# Patient Record
Sex: FEMALE | Race: WHITE | NOT HISPANIC OR LATINO | Employment: FULL TIME | URBAN - METROPOLITAN AREA
[De-identification: names, ages, dates, MRNs, and addresses within clinical notes are randomized per-mention and may not be internally consistent; named-entity substitution may affect disease eponyms.]

---

## 2017-02-10 RX ORDER — ATENOLOL 50 MG/1
50 TABLET ORAL DAILY
Qty: 90 TABLET | Refills: 1 | Status: SHIPPED | OUTPATIENT
Start: 2017-02-10 | End: 2017-06-07 | Stop reason: SDUPTHER

## 2017-02-27 DIAGNOSIS — I10 ESSENTIAL HYPERTENSION: ICD-10-CM

## 2017-02-27 DIAGNOSIS — E74.39 MALABSORPTION OF GLUCOSE: ICD-10-CM

## 2017-02-27 DIAGNOSIS — E78.2 MIXED HYPERLIPIDEMIA: ICD-10-CM

## 2017-02-27 DIAGNOSIS — E55.9 VITAMIN D DEFICIENCY: ICD-10-CM

## 2017-02-27 DIAGNOSIS — I15.9 SECONDARY HYPERTENSION: Primary | ICD-10-CM

## 2017-02-27 LAB
25(OH)D3+25(OH)D2 SERPL-MCNC: 26.7 NG/ML
ALBUMIN SERPL-MCNC: 3.9 G/DL (ref 3.5–5.2)
ALBUMIN/GLOB SERPL: 1.6 G/DL
ALP SERPL-CCNC: 37 U/L (ref 40–129)
ALT SERPL-CCNC: 8 U/L (ref 5–33)
AST SERPL-CCNC: 9 U/L (ref 5–32)
BASOPHILS # BLD AUTO: 0.04 10*3/MM3 (ref 0–0.2)
BASOPHILS NFR BLD AUTO: 0.7 % (ref 0–2)
BILIRUB SERPL-MCNC: 0.3 MG/DL (ref 0.2–1.2)
BUN SERPL-MCNC: 14 MG/DL (ref 6–20)
BUN/CREAT SERPL: 14 (ref 7–25)
CALCIUM SERPL-MCNC: 9.5 MG/DL (ref 8.6–10.5)
CHLORIDE SERPL-SCNC: 100 MMOL/L (ref 98–107)
CHOLEST SERPL-MCNC: 182 MG/DL (ref 0–200)
CO2 SERPL-SCNC: 26.7 MMOL/L (ref 22–29)
CREAT SERPL-MCNC: 1 MG/DL (ref 0.57–1)
EOSINOPHIL # BLD AUTO: 0.16 10*3/MM3 (ref 0.1–0.3)
EOSINOPHIL NFR BLD AUTO: 2.7 % (ref 0–4)
ERYTHROCYTE [DISTWIDTH] IN BLOOD BY AUTOMATED COUNT: 13.2 % (ref 11.5–14.5)
GLOBULIN SER CALC-MCNC: 2.5 GM/DL
GLUCOSE SERPL-MCNC: 111 MG/DL (ref 65–99)
HBA1C MFR BLD: 5.5 % (ref 4.8–5.6)
HCT VFR BLD AUTO: 41.5 % (ref 37–47)
HDLC SERPL-MCNC: 46 MG/DL (ref 40–60)
HGB BLD-MCNC: 13.4 G/DL (ref 12–16)
IMM GRANULOCYTES # BLD: 0.02 10*3/MM3 (ref 0–0.03)
IMM GRANULOCYTES NFR BLD: 0.3 % (ref 0–0.5)
LDLC SERPL CALC-MCNC: 99 MG/DL (ref 0–100)
LYMPHOCYTES # BLD AUTO: 1.34 10*3/MM3 (ref 0.6–4.8)
LYMPHOCYTES NFR BLD AUTO: 22.3 % (ref 20–45)
MCH RBC QN AUTO: 30.4 PG (ref 27–31)
MCHC RBC AUTO-ENTMCNC: 32.3 G/DL (ref 31–37)
MCV RBC AUTO: 94.1 FL (ref 81–99)
MONOCYTES # BLD AUTO: 0.44 10*3/MM3 (ref 0–1)
MONOCYTES NFR BLD AUTO: 7.3 % (ref 3–8)
NEUTROPHILS # BLD AUTO: 4.02 10*3/MM3 (ref 1.5–8.3)
NEUTROPHILS NFR BLD AUTO: 66.7 % (ref 45–70)
NRBC BLD AUTO-RTO: 0 /100 WBC (ref 0–0)
PLATELET # BLD AUTO: 260 10*3/MM3 (ref 140–500)
POTASSIUM SERPL-SCNC: 4.8 MMOL/L (ref 3.5–5.2)
PROT SERPL-MCNC: 6.4 G/DL (ref 6–8.5)
RBC # BLD AUTO: 4.41 10*6/MM3 (ref 4.2–5.4)
SODIUM SERPL-SCNC: 141 MMOL/L (ref 136–145)
TRIGL SERPL-MCNC: 187 MG/DL (ref 0–150)
TSH SERPL DL<=0.005 MIU/L-ACNC: 3.12 MIU/ML (ref 0.27–4.2)
VLDLC SERPL CALC-MCNC: 37.4 MG/DL (ref 7–27)
WBC # BLD AUTO: 6.02 10*3/MM3 (ref 4.8–10.8)

## 2017-03-08 ENCOUNTER — OFFICE VISIT (OUTPATIENT)
Dept: FAMILY MEDICINE CLINIC | Facility: CLINIC | Age: 57
End: 2017-03-08

## 2017-03-08 VITALS
BODY MASS INDEX: 38.57 KG/M2 | WEIGHT: 240 LBS | HEIGHT: 66 IN | TEMPERATURE: 98.4 F | OXYGEN SATURATION: 98 % | DIASTOLIC BLOOD PRESSURE: 80 MMHG | SYSTOLIC BLOOD PRESSURE: 130 MMHG | HEART RATE: 38 BPM

## 2017-03-08 DIAGNOSIS — E66.09 EXOGENOUS OBESITY: ICD-10-CM

## 2017-03-08 DIAGNOSIS — I10 ESSENTIAL HYPERTENSION: Primary | ICD-10-CM

## 2017-03-08 DIAGNOSIS — E55.9 VITAMIN D DEFICIENCY: ICD-10-CM

## 2017-03-08 DIAGNOSIS — E78.2 MIXED HYPERLIPIDEMIA: ICD-10-CM

## 2017-03-08 DIAGNOSIS — Z12.31 ENCOUNTER FOR SCREENING MAMMOGRAM FOR BREAST CANCER: ICD-10-CM

## 2017-03-08 DIAGNOSIS — E74.39 MALABSORPTION OF GLUCOSE: ICD-10-CM

## 2017-03-08 PROCEDURE — 99213 OFFICE O/P EST LOW 20 MIN: CPT | Performed by: FAMILY MEDICINE

## 2017-03-08 NOTE — PROGRESS NOTES
"Subjective   Marlena Waite is a 56 y.o. female with   Chief Complaint   Patient presents with   • Hypertension     lab follow up   .    History of Present Illness     Marlena is a 56 yr old white female that presents today for follow up HTN, HLD, Vitamin D Deficiency, and Glucose Intolerance.  Currently she uses Atenolol to control blood pressure.  She denies being symptomatic with dizziness upon standing.  Patient has suffered from exogenous obesity and continues to struggle with a discipline diet and exercise program.  She states that she has joined [employer] and is beginning to return to use this facility.  Along with that she is also disciplining her diet more.  Fasting labs have been acquired prior to this visit.      The following portions of the patient's history were reviewed and updated as appropriate: allergies, current medications, past family history, past medical history, past social history, past surgical history and problem list.    Review of Systems   Constitutional:        Exogenous obesity   Cardiovascular:        HTN  HLD   Endocrine:        Vitamin D Def  Glucose intolerance       Objective     Vitals:    03/08/17 1335   BP: 130/80   Pulse: (!) 38   Temp: 98.4 °F (36.9 °C)   SpO2: 98%     BP Readings from Last 3 Encounters:   03/08/17 130/80   08/02/16 116/78   08/04/15 128/62      Wt Readings from Last 3 Encounters:   03/08/17 240 lb (109 kg)   08/02/16 236 lb (107 kg)   08/04/15 231 lb (105 kg)      Orders Only on 02/27/2017   Component Date Value Ref Range Status   • WBC 02/27/2017 6.02  4.80 - 10.80 10*3/mm3 Final   • RBC 02/27/2017 4.41  4.20 - 5.40 10*6/mm3 Final   • Hemoglobin 02/27/2017 13.4  12.0 - 16.0 g/dL Final   • Hematocrit 02/27/2017 41.5  37.0 - 47.0 % Final   • MCV 02/27/2017 94.1  81.0 - 99.0 fL Final   • MCH 02/27/2017 30.4  27.0 - 31.0 pg Final   • MCHC 02/27/2017 32.3  31.0 - 37.0 g/dL Final   • RDW 02/27/2017 13.2  11.5 - 14.5 % Final   • Platelets 02/27/2017 260  140 " - 500 10*3/mm3 Final   • Neutrophil Rel % 02/27/2017 66.7  45.0 - 70.0 % Final   • Lymphocyte Rel % 02/27/2017 22.3  20.0 - 45.0 % Final   • Monocyte Rel % 02/27/2017 7.3  3.0 - 8.0 % Final   • Eosinophil Rel % 02/27/2017 2.7  0.0 - 4.0 % Final   • Basophil Rel % 02/27/2017 0.7  0.0 - 2.0 % Final   • Neutrophils Absolute 02/27/2017 4.02  1.50 - 8.30 10*3/mm3 Final   • Lymphocytes Absolute 02/27/2017 1.34  0.60 - 4.80 10*3/mm3 Final   • Monocytes Absolute 02/27/2017 0.44  0.00 - 1.00 10*3/mm3 Final   • Eosinophils Absolute 02/27/2017 0.16  0.10 - 0.30 10*3/mm3 Final   • Basophils Absolute 02/27/2017 0.04  0.00 - 0.20 10*3/mm3 Final   • Immature Granulocyte Rel % 02/27/2017 0.3  0.0 - 0.5 % Final   • Immature Grans Absolute 02/27/2017 0.02  0.00 - 0.03 10*3/mm3 Final   • nRBC 02/27/2017 0.0  0.0 - 0.0 /100 WBC Final   • Glucose 02/27/2017 111* 65 - 99 mg/dL Final   • BUN 02/27/2017 14  6 - 20 mg/dL Final   • Creatinine 02/27/2017 1.00  0.57 - 1.00 mg/dL Final   • eGFR Non African Am 02/27/2017 57* >60 mL/min/1.73 Final   • eGFR African Am 02/27/2017 69  >60 mL/min/1.73 Final   • BUN/Creatinine Ratio 02/27/2017 14.0  7.0 - 25.0 Final   • Sodium 02/27/2017 141  136 - 145 mmol/L Final   • Potassium 02/27/2017 4.8  3.5 - 5.2 mmol/L Final   • Chloride 02/27/2017 100  98 - 107 mmol/L Final   • Total CO2 02/27/2017 26.7  22.0 - 29.0 mmol/L Final   • Calcium 02/27/2017 9.5  8.6 - 10.5 mg/dL Final   • Total Protein 02/27/2017 6.4  6.0 - 8.5 g/dL Final   • Albumin 02/27/2017 3.90  3.50 - 5.20 g/dL Final   • Globulin 02/27/2017 2.5  gm/dL Final   • A/G Ratio 02/27/2017 1.6  g/dL Final   • Total Bilirubin 02/27/2017 0.3  0.2 - 1.2 mg/dL Final   • Alkaline Phosphatase 02/27/2017 37* 40 - 129 U/L Final   • AST (SGOT) 02/27/2017 9  5 - 32 U/L Final   • ALT (SGPT) 02/27/2017 8  5 - 33 U/L Final   • Hemoglobin A1C 02/27/2017 5.50  4.80 - 5.60 % Final   • Total Cholesterol 02/27/2017 182  0 - 200 mg/dL Final   • Triglycerides  02/27/2017 187* 0 - 150 mg/dL Final   • HDL Cholesterol 02/27/2017 46  40 - 60 mg/dL Final   • VLDL Cholesterol 02/27/2017 37.4* 7 - 27 mg/dL Final   • LDL Cholesterol  02/27/2017 99  0 - 100 mg/dL Final   • TSH 02/27/2017 3.120  0.270 - 4.200 mIU/mL Final   • 25 Hydroxy, Vitamin D 02/27/2017 26.7  ng/mL Final    Comment: Reference Range for Total Vitamin D 25(OH)  Deficiency    <20.0 ng/mL  Insufficiency 21-29 ng/mL  Sufficiency   30-74 ng/mL       The above results have been reviewed and explained to Marlena with her understanding.  A copy has been provided to her.     Physical Exam   Constitutional: She is oriented to person, place, and time. She appears well-developed and well-nourished.   HENT:   Head: Normocephalic and atraumatic.   Neck: Trachea normal and phonation normal. Neck supple. Normal carotid pulses present. Carotid bruit is not present. No thyroid mass and no thyromegaly present.   Cardiovascular: Normal rate, regular rhythm and normal heart sounds.  Exam reveals no gallop and no friction rub.    No murmur heard.  Pulmonary/Chest: Effort normal and breath sounds normal. No respiratory distress. She has no decreased breath sounds. She has no wheezes. She has no rhonchi. She has no rales.   Lymphadenopathy:     She has no cervical adenopathy.   Neurological: She is alert and oriented to person, place, and time.   Skin: Skin is warm and dry. No rash noted.   Psychiatric: She has a normal mood and affect. Her speech is normal and behavior is normal. Judgment and thought content normal. Cognition and memory are normal.   Nursing note and vitals reviewed.      Assessment/Plan   Marlena was seen today for hypertension.    Diagnoses and all orders for this visit:    Essential hypertension  -     CBC & Differential; Future  -     Comprehensive Metabolic Panel; Future  -     Lipid Panel; Future  -     TSH; Future    Mixed hyperlipidemia  -     CBC & Differential; Future  -     Comprehensive Metabolic Panel;  Future  -     Lipid Panel; Future    Vitamin D deficiency  -     CBC & Differential; Future  -     Comprehensive Metabolic Panel; Future  -     Vitamin D 25 Hydroxy; Future    Malabsorption of glucose  -     CBC & Differential; Future  -     Comprehensive Metabolic Panel; Future  -     Hemoglobin A1c; Future    Encounter for screening mammogram for breast cancer  -     Mammo Screening Bilateral With CAD; Future    Exogenous obesity      Return in about 6 months (around 9/8/2017).      Scribed for Jordan Tavarez MD by Daryl Haley. 03/08/2017    IJordan MD personally performed the services described in this documentation, as scribed by Daryl Haley in my presence, and it is both accurate and complete

## 2017-03-09 PROBLEM — E66.09 EXOGENOUS OBESITY: Status: ACTIVE | Noted: 2017-03-09

## 2017-03-09 NOTE — PATIENT INSTRUCTIONS
Weight loss again has been encouraged.  This will require a discipline diet and exercise program.  Current medications will be continued without alteration.  Anticipate next follow-up in 6 months preceded by fasting laboratory studies.

## 2017-03-21 ENCOUNTER — HOSPITAL ENCOUNTER (OUTPATIENT)
Dept: MAMMOGRAPHY | Facility: HOSPITAL | Age: 57
Discharge: HOME | End: 2017-03-21
Admitting: FAMILY MEDICINE

## 2017-03-21 DIAGNOSIS — Z12.31 ENCOUNTER FOR SCREENING MAMMOGRAM FOR BREAST CANCER: ICD-10-CM

## 2017-03-21 DIAGNOSIS — R92.8 ABNORMAL MAMMOGRAM OF LEFT BREAST: Primary | ICD-10-CM

## 2017-03-21 PROCEDURE — G0202 SCR MAMMO BI INCL CAD: HCPCS

## 2017-03-28 ENCOUNTER — HOSPITAL ENCOUNTER (OUTPATIENT)
Dept: ULTRASOUND IMAGING | Facility: HOSPITAL | Age: 57
Discharge: HOME | End: 2017-03-28
Admitting: FAMILY MEDICINE

## 2017-03-28 DIAGNOSIS — R92.8 ABNORMAL MAMMOGRAM OF LEFT BREAST: ICD-10-CM

## 2017-03-28 PROCEDURE — 76641 ULTRASOUND BREAST COMPLETE: CPT

## 2017-06-07 RX ORDER — ATENOLOL 50 MG/1
50 TABLET ORAL DAILY
Qty: 90 TABLET | Refills: 1 | Status: SHIPPED | OUTPATIENT
Start: 2017-06-07 | End: 2017-09-18 | Stop reason: SDUPTHER

## 2017-08-24 DIAGNOSIS — E74.39 MALABSORPTION OF GLUCOSE: ICD-10-CM

## 2017-08-24 DIAGNOSIS — I10 ESSENTIAL HYPERTENSION: ICD-10-CM

## 2017-08-24 DIAGNOSIS — E78.2 MIXED HYPERLIPIDEMIA: ICD-10-CM

## 2017-08-24 DIAGNOSIS — E55.9 VITAMIN D DEFICIENCY: ICD-10-CM

## 2017-08-28 LAB
ALBUMIN SERPL-MCNC: 4 G/DL (ref 3.5–5.2)
ALBUMIN/GLOB SERPL: 1.6 G/DL
ALP SERPL-CCNC: 34 U/L (ref 40–129)
ALT SERPL-CCNC: 25 U/L (ref 5–33)
AST SERPL-CCNC: 16 U/L (ref 5–32)
BASOPHILS # BLD AUTO: 0.04 10*3/MM3 (ref 0–0.2)
BASOPHILS NFR BLD AUTO: 0.7 % (ref 0–2)
BILIRUB SERPL-MCNC: 0.4 MG/DL (ref 0.2–1.2)
BUN SERPL-MCNC: 20 MG/DL (ref 6–20)
BUN/CREAT SERPL: 20 (ref 7–25)
CALCIUM SERPL-MCNC: 9.4 MG/DL (ref 8.6–10.5)
CHLORIDE SERPL-SCNC: 100 MMOL/L (ref 98–107)
CHOLEST SERPL-MCNC: 178 MG/DL (ref 0–200)
CO2 SERPL-SCNC: 26.5 MMOL/L (ref 22–29)
CREAT SERPL-MCNC: 1 MG/DL (ref 0.57–1)
EOSINOPHIL # BLD AUTO: 0.15 10*3/MM3 (ref 0.1–0.3)
EOSINOPHIL NFR BLD AUTO: 2.7 % (ref 0–4)
ERYTHROCYTE [DISTWIDTH] IN BLOOD BY AUTOMATED COUNT: 13.3 % (ref 11.5–14.5)
GLOBULIN SER CALC-MCNC: 2.5 GM/DL
GLUCOSE SERPL-MCNC: 154 MG/DL (ref 65–99)
HBA1C MFR BLD: 5.5 % (ref 4.8–5.6)
HCT VFR BLD AUTO: 43.6 % (ref 37–47)
HDLC SERPL-MCNC: 38 MG/DL (ref 40–60)
HGB BLD-MCNC: 14 G/DL (ref 12–16)
IMM GRANULOCYTES # BLD: 0.03 10*3/MM3 (ref 0–0.03)
IMM GRANULOCYTES NFR BLD: 0.5 % (ref 0–0.5)
LDLC SERPL CALC-MCNC: 88 MG/DL (ref 0–100)
LYMPHOCYTES # BLD AUTO: 1.15 10*3/MM3 (ref 0.6–4.8)
LYMPHOCYTES NFR BLD AUTO: 20.8 % (ref 20–45)
MCH RBC QN AUTO: 30.6 PG (ref 27–31)
MCHC RBC AUTO-ENTMCNC: 32.1 G/DL (ref 31–37)
MCV RBC AUTO: 95.4 FL (ref 81–99)
MONOCYTES # BLD AUTO: 0.46 10*3/MM3 (ref 0–1)
MONOCYTES NFR BLD AUTO: 8.3 % (ref 3–8)
NEUTROPHILS # BLD AUTO: 3.69 10*3/MM3 (ref 1.5–8.3)
NEUTROPHILS NFR BLD AUTO: 67 % (ref 45–70)
NRBC BLD AUTO-RTO: 0 /100 WBC (ref 0–0)
PLATELET # BLD AUTO: 250 10*3/MM3 (ref 140–500)
POTASSIUM SERPL-SCNC: 4.6 MMOL/L (ref 3.5–5.2)
PROT SERPL-MCNC: 6.5 G/DL (ref 6–8.5)
RBC # BLD AUTO: 4.57 10*6/MM3 (ref 4.2–5.4)
SODIUM SERPL-SCNC: 138 MMOL/L (ref 136–145)
TRIGL SERPL-MCNC: 259 MG/DL (ref 0–150)
TSH SERPL DL<=0.005 MIU/L-ACNC: 3.4 MIU/ML (ref 0.27–4.2)
VLDLC SERPL CALC-MCNC: 51.8 MG/DL (ref 7–27)
WBC # BLD AUTO: 5.52 10*3/MM3 (ref 4.8–10.8)

## 2017-08-29 LAB — 25(OH)D3+25(OH)D2 SERPL-MCNC: 33.3 NG/ML

## 2017-09-05 ENCOUNTER — OFFICE VISIT (OUTPATIENT)
Dept: FAMILY MEDICINE CLINIC | Facility: CLINIC | Age: 57
End: 2017-09-05

## 2017-09-05 VITALS
DIASTOLIC BLOOD PRESSURE: 80 MMHG | SYSTOLIC BLOOD PRESSURE: 120 MMHG | HEIGHT: 66 IN | OXYGEN SATURATION: 89 % | TEMPERATURE: 98.4 F | WEIGHT: 245 LBS | HEART RATE: 89 BPM | BODY MASS INDEX: 39.37 KG/M2

## 2017-09-05 DIAGNOSIS — I10 ESSENTIAL HYPERTENSION: ICD-10-CM

## 2017-09-05 DIAGNOSIS — E66.09 EXOGENOUS OBESITY: ICD-10-CM

## 2017-09-05 DIAGNOSIS — E74.39 MALABSORPTION OF GLUCOSE: ICD-10-CM

## 2017-09-05 DIAGNOSIS — E55.9 VITAMIN D DEFICIENCY: ICD-10-CM

## 2017-09-05 DIAGNOSIS — E78.2 MIXED HYPERLIPIDEMIA: Primary | ICD-10-CM

## 2017-09-05 PROCEDURE — 99213 OFFICE O/P EST LOW 20 MIN: CPT | Performed by: FAMILY MEDICINE

## 2017-09-05 NOTE — PROGRESS NOTES
Subjective   Marlena Waite is a 57 y.o. female with   Chief Complaint   Patient presents with   • Hyperlipidemia     follow up labs   • Hypertension   .    History of Present Illness     Patient presents in follow up of HLD, HTN, Vitamin D Deficiency and Glucose Intolerance.  Patient continues to use Atenolol daily and tolerates this well.  Patient has gained weight since last visit.  Patient states that the last 9 months have been very stressful for her with issue with a family pet and helping a family member move.  Most recent mammogram was in March 2017.  There is no record of colonoscopy in [facility] or [facility].Patient has struggled with exogenous obesity and admits that she has gained weight most recently.  She states that she is in the process of moving her fiancé and/or home and this should be completed by the end of September.  Once this process has finished she states that they have already discussed displaying their diet, losing weight, and attending [employer] for workouts.      The following portions of the patient's history were reviewed and updated as appropriate: allergies, current medications, past family history, past medical history, past social history, past surgical history and problem list.    Review of Systems   Constitutional:        Morbidly obese   Cardiovascular:        HTN  HLD   Endocrine:        Glucose Intolerance  Vit D Def   All other systems reviewed and are negative.      Objective     Vitals:    09/05/17 0744   BP: 120/80   Pulse: 89   Temp: 98.4 °F (36.9 °C)   SpO2: (!) 89%     BP Readings from Last 3 Encounters:   09/05/17 120/80   03/08/17 130/80   08/02/16 116/78      Wt Readings from Last 3 Encounters:   09/05/17 245 lb (111 kg)   03/08/17 240 lb (109 kg)   08/02/16 236 lb (107 kg)      Orders Only on 08/24/2017   Component Date Value Ref Range Status   • WBC 08/28/2017 5.52  4.80 - 10.80 10*3/mm3 Final   • RBC 08/28/2017 4.57  4.20 - 5.40 10*6/mm3 Final   • Hemoglobin  08/28/2017 14.0  12.0 - 16.0 g/dL Final   • Hematocrit 08/28/2017 43.6  37.0 - 47.0 % Final   • MCV 08/28/2017 95.4  81.0 - 99.0 fL Final   • MCH 08/28/2017 30.6  27.0 - 31.0 pg Final   • MCHC 08/28/2017 32.1  31.0 - 37.0 g/dL Final   • RDW 08/28/2017 13.3  11.5 - 14.5 % Final   • Platelets 08/28/2017 250  140 - 500 10*3/mm3 Final   • Neutrophil Rel % 08/28/2017 67.0  45.0 - 70.0 % Final   • Lymphocyte Rel % 08/28/2017 20.8  20.0 - 45.0 % Final   • Monocyte Rel % 08/28/2017 8.3* 3.0 - 8.0 % Final   • Eosinophil Rel % 08/28/2017 2.7  0.0 - 4.0 % Final   • Basophil Rel % 08/28/2017 0.7  0.0 - 2.0 % Final   • Neutrophils Absolute 08/28/2017 3.69  1.50 - 8.30 10*3/mm3 Final   • Lymphocytes Absolute 08/28/2017 1.15  0.60 - 4.80 10*3/mm3 Final   • Monocytes Absolute 08/28/2017 0.46  0.00 - 1.00 10*3/mm3 Final   • Eosinophils Absolute 08/28/2017 0.15  0.10 - 0.30 10*3/mm3 Final   • Basophils Absolute 08/28/2017 0.04  0.00 - 0.20 10*3/mm3 Final   • Immature Granulocyte Rel % 08/28/2017 0.5  0.0 - 0.5 % Final   • Immature Grans Absolute 08/28/2017 0.03  0.00 - 0.03 10*3/mm3 Final   • nRBC 08/28/2017 0.0  0.0 - 0.0 /100 WBC Final   • Glucose 08/28/2017 154* 65 - 99 mg/dL Final   • BUN 08/28/2017 20  6 - 20 mg/dL Final   • Creatinine 08/28/2017 1.00  0.57 - 1.00 mg/dL Final   • eGFR Non African Am 08/28/2017 57* >60 mL/min/1.73 Final   • eGFR African Am 08/28/2017 69  >60 mL/min/1.73 Final   • BUN/Creatinine Ratio 08/28/2017 20.0  7.0 - 25.0 Final   • Sodium 08/28/2017 138  136 - 145 mmol/L Final   • Potassium 08/28/2017 4.6  3.5 - 5.2 mmol/L Final   • Chloride 08/28/2017 100  98 - 107 mmol/L Final   • Total CO2 08/28/2017 26.5  22.0 - 29.0 mmol/L Final   • Calcium 08/28/2017 9.4  8.6 - 10.5 mg/dL Final   • Total Protein 08/28/2017 6.5  6.0 - 8.5 g/dL Final   • Albumin 08/28/2017 4.00  3.50 - 5.20 g/dL Final   • Globulin 08/28/2017 2.5  gm/dL Final   • A/G Ratio 08/28/2017 1.6  g/dL Final   • Total Bilirubin 08/28/2017 0.4   0.2 - 1.2 mg/dL Final   • Alkaline Phosphatase 08/28/2017 34* 40 - 129 U/L Final   • AST (SGOT) 08/28/2017 16  5 - 32 U/L Final   • ALT (SGPT) 08/28/2017 25  5 - 33 U/L Final   • Hemoglobin A1C 08/28/2017 5.50  4.80 - 5.60 % Final   • Total Cholesterol 08/28/2017 178  0 - 200 mg/dL Final   • Triglycerides 08/28/2017 259* 0 - 150 mg/dL Final   • HDL Cholesterol 08/28/2017 38* 40 - 60 mg/dL Final   • VLDL Cholesterol 08/28/2017 51.8* 7 - 27 mg/dL Final   • LDL Cholesterol  08/28/2017 88  0 - 100 mg/dL Final   • TSH 08/28/2017 3.400  0.270 - 4.200 mIU/mL Final   • 25 Hydroxy, Vitamin D 08/28/2017 33.3  ng/mL Final    Comment: Reference Range for Total Vitamin D 25(OH)  Deficiency    <20.0 ng/mL  Insufficiency 21-29 ng/mL  Sufficiency   30-74 ng/mL       The above results have been reviewed and explained to the patient.  The patient has been provided a copy    Physical Exam   Constitutional: She is oriented to person, place, and time. She appears well-developed and well-nourished.   HENT:   Head: Normocephalic and atraumatic.   Neck: Trachea normal and phonation normal. Neck supple. Normal carotid pulses present. Carotid bruit is not present. No thyroid mass and no thyromegaly present.   Cardiovascular: Normal rate, regular rhythm and normal heart sounds.  Exam reveals no gallop and no friction rub.    No murmur heard.  Pulmonary/Chest: Effort normal and breath sounds normal. No respiratory distress. She has no decreased breath sounds. She has no wheezes. She has no rhonchi. She has no rales.   Lymphadenopathy:     She has no cervical adenopathy.   Neurological: She is alert and oriented to person, place, and time.   Skin: Skin is warm and dry. No rash noted.   Psychiatric: She has a normal mood and affect. Her speech is normal and behavior is normal. Judgment and thought content normal. Cognition and memory are normal.   Nursing note and vitals reviewed.      Assessment/Plan   Marlena was seen today for hyperlipidemia  and hypertension.    Diagnoses and all orders for this visit:    Mixed hyperlipidemia  -     CBC & Differential; Future  -     Comprehensive Metabolic Panel; Future  -     Lipid Panel; Future    Essential hypertension  -     CBC & Differential; Future  -     Comprehensive Metabolic Panel; Future  -     Lipid Panel; Future  -     TSH; Future    Vitamin D deficiency  -     CBC & Differential; Future  -     Comprehensive Metabolic Panel; Future  -     Vitamin D 25 Hydroxy; Future    Malabsorption of glucose  -     CBC & Differential; Future  -     Comprehensive Metabolic Panel; Future  -     Hemoglobin A1c; Future    Exogenous obesity      Patient Instructions   Avoid sugar in diet.  Restrict calories to help lower weight.  Start exercising more to improve HDL cholesterol.  Patient is due for routine colonoscopy.        Return in about 6 months (around 3/5/2018).    Scribed for Jordan Tavarez MD by Daryl Haley. 09/05/2017    IJordan MD personally performed the services described in this documentation, as scribed by Daryl Haley in my presence, and it is both accurate and complete

## 2017-09-05 NOTE — PATIENT INSTRUCTIONS
Avoid sugar in diet.  Restrict calories to help lower weight.  Start exercising more to improve HDL cholesterol.  Patient is due for routine colonoscopy.

## 2017-09-18 RX ORDER — ATENOLOL 50 MG/1
50 TABLET ORAL DAILY
Qty: 90 TABLET | Refills: 0 | Status: SHIPPED | OUTPATIENT
Start: 2017-09-18 | End: 2017-09-21 | Stop reason: SDUPTHER

## 2017-09-21 RX ORDER — ATENOLOL 50 MG/1
50 TABLET ORAL DAILY
Qty: 90 TABLET | Refills: 1 | Status: SHIPPED | OUTPATIENT
Start: 2017-09-21 | End: 2018-03-05 | Stop reason: SDUPTHER

## 2018-03-05 ENCOUNTER — RESULTS ENCOUNTER (OUTPATIENT)
Dept: FAMILY MEDICINE CLINIC | Facility: CLINIC | Age: 58
End: 2018-03-05

## 2018-03-05 DIAGNOSIS — E55.9 VITAMIN D DEFICIENCY: ICD-10-CM

## 2018-03-05 DIAGNOSIS — E74.39 MALABSORPTION OF GLUCOSE: ICD-10-CM

## 2018-03-05 DIAGNOSIS — E78.2 MIXED HYPERLIPIDEMIA: ICD-10-CM

## 2018-03-05 DIAGNOSIS — I10 ESSENTIAL HYPERTENSION: ICD-10-CM

## 2018-03-06 RX ORDER — ATENOLOL 50 MG/1
TABLET ORAL
Qty: 90 TABLET | Refills: 0 | Status: SHIPPED | OUTPATIENT
Start: 2018-03-06 | End: 2018-05-25 | Stop reason: SDUPTHER

## 2018-03-08 LAB
25(OH)D3+25(OH)D2 SERPL-MCNC: 31.2 NG/ML
ALBUMIN SERPL-MCNC: 4.2 G/DL (ref 3.5–5.2)
ALBUMIN/GLOB SERPL: 1.8 G/DL
ALP SERPL-CCNC: 35 U/L (ref 40–129)
ALT SERPL-CCNC: 37 U/L (ref 5–33)
AST SERPL-CCNC: 20 U/L (ref 5–32)
BASOPHILS # BLD AUTO: 0.04 10*3/MM3 (ref 0–0.2)
BASOPHILS NFR BLD AUTO: 0.9 % (ref 0–2)
BILIRUB SERPL-MCNC: 0.6 MG/DL (ref 0.2–1.2)
BUN SERPL-MCNC: 15 MG/DL (ref 6–20)
BUN/CREAT SERPL: 16.9 (ref 7–25)
CALCIUM SERPL-MCNC: 9.7 MG/DL (ref 8.6–10.5)
CHLORIDE SERPL-SCNC: 103 MMOL/L (ref 98–107)
CHOLEST SERPL-MCNC: 198 MG/DL (ref 0–200)
CO2 SERPL-SCNC: 27.3 MMOL/L (ref 22–29)
CREAT SERPL-MCNC: 0.89 MG/DL (ref 0.57–1)
EOSINOPHIL # BLD AUTO: 0.12 10*3/MM3 (ref 0.1–0.3)
EOSINOPHIL NFR BLD AUTO: 2.6 % (ref 0–4)
ERYTHROCYTE [DISTWIDTH] IN BLOOD BY AUTOMATED COUNT: 13.2 % (ref 11.5–14.5)
GFR SERPLBLD CREATININE-BSD FMLA CKD-EPI: 65 ML/MIN/1.73
GFR SERPLBLD CREATININE-BSD FMLA CKD-EPI: 79 ML/MIN/1.73
GLOBULIN SER CALC-MCNC: 2.3 GM/DL
GLUCOSE SERPL-MCNC: 152 MG/DL (ref 65–99)
HBA1C MFR BLD: 6 % (ref 4.8–5.6)
HCT VFR BLD AUTO: 42.5 % (ref 37–47)
HDLC SERPL-MCNC: 41 MG/DL (ref 40–60)
HGB BLD-MCNC: 14 G/DL (ref 12–16)
IMM GRANULOCYTES # BLD: 0.02 10*3/MM3 (ref 0–0.03)
IMM GRANULOCYTES NFR BLD: 0.4 % (ref 0–0.5)
LDLC SERPL CALC-MCNC: 95 MG/DL (ref 0–100)
LYMPHOCYTES # BLD AUTO: 1.08 10*3/MM3 (ref 0.6–4.8)
LYMPHOCYTES NFR BLD AUTO: 23 % (ref 20–45)
MCH RBC QN AUTO: 30.7 PG (ref 27–31)
MCHC RBC AUTO-ENTMCNC: 32.9 G/DL (ref 31–37)
MCV RBC AUTO: 93.2 FL (ref 81–99)
MONOCYTES # BLD AUTO: 0.34 10*3/MM3 (ref 0–1)
MONOCYTES NFR BLD AUTO: 7.2 % (ref 3–8)
NEUTROPHILS # BLD AUTO: 3.09 10*3/MM3 (ref 1.5–8.3)
NEUTROPHILS NFR BLD AUTO: 65.9 % (ref 45–70)
NRBC BLD AUTO-RTO: 0 /100 WBC (ref 0–0)
PLATELET # BLD AUTO: 212 10*3/MM3 (ref 140–500)
POTASSIUM SERPL-SCNC: 4.5 MMOL/L (ref 3.5–5.2)
PROT SERPL-MCNC: 6.5 G/DL (ref 6–8.5)
RBC # BLD AUTO: 4.56 10*6/MM3 (ref 4.2–5.4)
SODIUM SERPL-SCNC: 142 MMOL/L (ref 136–145)
TRIGL SERPL-MCNC: 309 MG/DL (ref 0–150)
TSH SERPL DL<=0.005 MIU/L-ACNC: 3.48 MIU/ML (ref 0.27–4.2)
VLDLC SERPL CALC-MCNC: 61.8 MG/DL (ref 7–27)
WBC # BLD AUTO: 4.69 10*3/MM3 (ref 4.8–10.8)

## 2018-03-14 ENCOUNTER — OFFICE VISIT (OUTPATIENT)
Dept: FAMILY MEDICINE CLINIC | Facility: CLINIC | Age: 58
End: 2018-03-14

## 2018-03-14 VITALS
SYSTOLIC BLOOD PRESSURE: 122 MMHG | WEIGHT: 251 LBS | BODY MASS INDEX: 40.34 KG/M2 | HEIGHT: 66 IN | OXYGEN SATURATION: 98 % | TEMPERATURE: 97.6 F | HEART RATE: 82 BPM | DIASTOLIC BLOOD PRESSURE: 80 MMHG

## 2018-03-14 DIAGNOSIS — E78.2 MIXED HYPERLIPIDEMIA: Primary | ICD-10-CM

## 2018-03-14 DIAGNOSIS — I10 ESSENTIAL HYPERTENSION: ICD-10-CM

## 2018-03-14 DIAGNOSIS — E55.9 VITAMIN D DEFICIENCY: ICD-10-CM

## 2018-03-14 DIAGNOSIS — N91.2 AMENORRHEA: ICD-10-CM

## 2018-03-14 DIAGNOSIS — E66.01 MORBID EXOGENOUS OBESITY (HCC): ICD-10-CM

## 2018-03-14 DIAGNOSIS — R73.02 GLUCOSE INTOLERANCE (IMPAIRED GLUCOSE TOLERANCE): ICD-10-CM

## 2018-03-14 PROCEDURE — 99213 OFFICE O/P EST LOW 20 MIN: CPT | Performed by: FAMILY MEDICINE

## 2018-03-14 NOTE — PATIENT INSTRUCTIONS
Results for orders placed or performed in visit on 03/05/18   Comprehensive Metabolic Panel   Result Value Ref Range    Glucose 152 (H) 65 - 99 mg/dL    BUN 15 6 - 20 mg/dL    Creatinine 0.89 0.57 - 1.00 mg/dL    eGFR Non African Am 65 >60 mL/min/1.73    eGFR African Am 79 >60 mL/min/1.73    BUN/Creatinine Ratio 16.9 7.0 - 25.0    Sodium 142 136 - 145 mmol/L    Potassium 4.5 3.5 - 5.2 mmol/L    Chloride 103 98 - 107 mmol/L    Total CO2 27.3 22.0 - 29.0 mmol/L    Calcium 9.7 8.6 - 10.5 mg/dL    Total Protein 6.5 6.0 - 8.5 g/dL    Albumin 4.20 3.50 - 5.20 g/dL    Globulin 2.3 gm/dL    A/G Ratio 1.8 g/dL    Total Bilirubin 0.6 0.2 - 1.2 mg/dL    Alkaline Phosphatase 35 (L) 40 - 129 U/L    AST (SGOT) 20 5 - 32 U/L    ALT (SGPT) 37 (H) 5 - 33 U/L   Hemoglobin A1c   Result Value Ref Range    Hemoglobin A1C 6.00 (H) 4.80 - 5.60 %   Lipid Panel   Result Value Ref Range    Total Cholesterol 198 0 - 200 mg/dL    Triglycerides 309 (H) 0 - 150 mg/dL    HDL Cholesterol 41 40 - 60 mg/dL    VLDL Cholesterol 61.8 (H) 7 - 27 mg/dL    LDL Cholesterol  95 0 - 100 mg/dL   TSH   Result Value Ref Range    TSH 3.480 0.270 - 4.200 mIU/mL   Vitamin D 25 Hydroxy   Result Value Ref Range    25 Hydroxy, Vitamin D 31.2 ng/ml   CBC & Differential   Result Value Ref Range    WBC 4.69 (L) 4.80 - 10.80 10*3/mm3    RBC 4.56 4.20 - 5.40 10*6/mm3    Hemoglobin 14.0 12.0 - 16.0 g/dL    Hematocrit 42.5 37.0 - 47.0 %    MCV 93.2 81.0 - 99.0 fL    MCH 30.7 27.0 - 31.0 pg    MCHC 32.9 31.0 - 37.0 g/dL    RDW 13.2 11.5 - 14.5 %    Platelets 212 140 - 500 10*3/mm3    Neutrophil Rel % 65.9 45.0 - 70.0 %    Lymphocyte Rel % 23.0 20.0 - 45.0 %    Monocyte Rel % 7.2 3.0 - 8.0 %    Eosinophil Rel % 2.6 0.0 - 4.0 %    Basophil Rel % 0.9 0.0 - 2.0 %    Neutrophils Absolute 3.09 1.50 - 8.30 10*3/mm3    Lymphocytes Absolute 1.08 0.60 - 4.80 10*3/mm3    Monocytes Absolute 0.34 0.00 - 1.00 10*3/mm3    Eosinophils Absolute 0.12 0.10 - 0.30 10*3/mm3    Basophils Absolute  0.04 0.00 - 0.20 10*3/mm3    Immature Granulocyte Rel % 0.4 0.0 - 0.5 %    Immature Grans Absolute 0.02 0.00 - 0.03 10*3/mm3    nRBC 0.0 0.0 - 0.0 /100 WBC     Lower weight with diet and exercise  Continue current medications

## 2018-03-14 NOTE — PROGRESS NOTES
Subjective   Marlena Waite is a 57 y.o. female with   Chief Complaint   Patient presents with   • Hyperlipidemia     follow up labs   • Hypertension   .    History of Present Illness     Patient presents for follow up of stable HLD and stable HTN, stable Vitamin D Deficiency, and unstable Glucose Intolerance.  Current antihypertensive is well tolerated.  Patient has gained weight since last visit.  Patient states that she may have started to go through menopause as January 2018 was her last menstrual cycle.      The following portions of the patient's history were reviewed and updated as appropriate: allergies, current medications, past family history, past medical history, past social history, past surgical history and problem list.    Review of Systems   Constitutional:        Morbidly obese   Cardiovascular: Negative for chest pain, palpitations and leg swelling.        HTN  HLD   Endocrine: Negative for cold intolerance and heat intolerance.        Glucose Intolerance  Vit D Def   All other systems reviewed and are negative.      Objective     Vitals:    03/14/18 0732   BP: 122/80   Pulse: 82   Temp: 97.6 °F (36.4 °C)   SpO2: 98%     BP Readings from Last 3 Encounters:   03/14/18 122/80   09/05/17 120/80   03/08/17 130/80      Wt Readings from Last 3 Encounters:   03/14/18 114 kg (251 lb)   09/05/17 111 kg (245 lb)   03/08/17 109 kg (240 lb)          Recent Results (from the past 168 hour(s))   CBC & Differential    Collection Time: 03/08/18  8:04 AM   Result Value Ref Range    WBC 4.69 (L) 4.80 - 10.80 10*3/mm3    RBC 4.56 4.20 - 5.40 10*6/mm3    Hemoglobin 14.0 12.0 - 16.0 g/dL    Hematocrit 42.5 37.0 - 47.0 %    MCV 93.2 81.0 - 99.0 fL    MCH 30.7 27.0 - 31.0 pg    MCHC 32.9 31.0 - 37.0 g/dL    RDW 13.2 11.5 - 14.5 %    Platelets 212 140 - 500 10*3/mm3    Neutrophil Rel % 65.9 45.0 - 70.0 %    Lymphocyte Rel % 23.0 20.0 - 45.0 %    Monocyte Rel % 7.2 3.0 - 8.0 %    Eosinophil Rel % 2.6 0.0 - 4.0 %     Basophil Rel % 0.9 0.0 - 2.0 %    Neutrophils Absolute 3.09 1.50 - 8.30 10*3/mm3    Lymphocytes Absolute 1.08 0.60 - 4.80 10*3/mm3    Monocytes Absolute 0.34 0.00 - 1.00 10*3/mm3    Eosinophils Absolute 0.12 0.10 - 0.30 10*3/mm3    Basophils Absolute 0.04 0.00 - 0.20 10*3/mm3    Immature Granulocyte Rel % 0.4 0.0 - 0.5 %    Immature Grans Absolute 0.02 0.00 - 0.03 10*3/mm3    nRBC 0.0 0.0 - 0.0 /100 WBC   Comprehensive Metabolic Panel    Collection Time: 03/08/18  8:04 AM   Result Value Ref Range    Glucose 152 (H) 65 - 99 mg/dL    BUN 15 6 - 20 mg/dL    Creatinine 0.89 0.57 - 1.00 mg/dL    eGFR Non African Am 65 >60 mL/min/1.73    eGFR African Am 79 >60 mL/min/1.73    BUN/Creatinine Ratio 16.9 7.0 - 25.0    Sodium 142 136 - 145 mmol/L    Potassium 4.5 3.5 - 5.2 mmol/L    Chloride 103 98 - 107 mmol/L    Total CO2 27.3 22.0 - 29.0 mmol/L    Calcium 9.7 8.6 - 10.5 mg/dL    Total Protein 6.5 6.0 - 8.5 g/dL    Albumin 4.20 3.50 - 5.20 g/dL    Globulin 2.3 gm/dL    A/G Ratio 1.8 g/dL    Total Bilirubin 0.6 0.2 - 1.2 mg/dL    Alkaline Phosphatase 35 (L) 40 - 129 U/L    AST (SGOT) 20 5 - 32 U/L    ALT (SGPT) 37 (H) 5 - 33 U/L   Hemoglobin A1c    Collection Time: 03/08/18  8:04 AM   Result Value Ref Range    Hemoglobin A1C 6.00 (H) 4.80 - 5.60 %   Lipid Panel    Collection Time: 03/08/18  8:04 AM   Result Value Ref Range    Total Cholesterol 198 0 - 200 mg/dL    Triglycerides 309 (H) 0 - 150 mg/dL    HDL Cholesterol 41 40 - 60 mg/dL    VLDL Cholesterol 61.8 (H) 7 - 27 mg/dL    LDL Cholesterol  95 0 - 100 mg/dL   TSH    Collection Time: 03/08/18  8:04 AM   Result Value Ref Range    TSH 3.480 0.270 - 4.200 mIU/mL   Vitamin D 25 Hydroxy    Collection Time: 03/08/18  8:04 AM   Result Value Ref Range    25 Hydroxy, Vitamin D 31.2 ng/ml       Physical Exam   Constitutional: She is oriented to person, place, and time. She appears well-developed and well-nourished.   HENT:   Head: Normocephalic and atraumatic.   Neck: Trachea  normal and phonation normal. Neck supple. Normal carotid pulses present. Carotid bruit is not present. No thyroid mass and no thyromegaly present.   Cardiovascular: Normal rate, regular rhythm and normal heart sounds.  Exam reveals no gallop and no friction rub.    No murmur heard.  Pulmonary/Chest: Effort normal and breath sounds normal. No respiratory distress. She has no decreased breath sounds. She has no wheezes. She has no rhonchi. She has no rales.   Lymphadenopathy:     She has no cervical adenopathy.   Neurological: She is alert and oriented to person, place, and time.   Skin: Skin is warm and dry. No rash noted.   Psychiatric: She has a normal mood and affect. Her speech is normal and behavior is normal. Judgment and thought content normal. Cognition and memory are normal.   Nursing note and vitals reviewed.    Patient Instructions     Results for orders placed or performed in visit on 03/05/18   Comprehensive Metabolic Panel   Result Value Ref Range    Glucose 152 (H) 65 - 99 mg/dL    BUN 15 6 - 20 mg/dL    Creatinine 0.89 0.57 - 1.00 mg/dL    eGFR Non African Am 65 >60 mL/min/1.73    eGFR African Am 79 >60 mL/min/1.73    BUN/Creatinine Ratio 16.9 7.0 - 25.0    Sodium 142 136 - 145 mmol/L    Potassium 4.5 3.5 - 5.2 mmol/L    Chloride 103 98 - 107 mmol/L    Total CO2 27.3 22.0 - 29.0 mmol/L    Calcium 9.7 8.6 - 10.5 mg/dL    Total Protein 6.5 6.0 - 8.5 g/dL    Albumin 4.20 3.50 - 5.20 g/dL    Globulin 2.3 gm/dL    A/G Ratio 1.8 g/dL    Total Bilirubin 0.6 0.2 - 1.2 mg/dL    Alkaline Phosphatase 35 (L) 40 - 129 U/L    AST (SGOT) 20 5 - 32 U/L    ALT (SGPT) 37 (H) 5 - 33 U/L   Hemoglobin A1c   Result Value Ref Range    Hemoglobin A1C 6.00 (H) 4.80 - 5.60 %   Lipid Panel   Result Value Ref Range    Total Cholesterol 198 0 - 200 mg/dL    Triglycerides 309 (H) 0 - 150 mg/dL    HDL Cholesterol 41 40 - 60 mg/dL    VLDL Cholesterol 61.8 (H) 7 - 27 mg/dL    LDL Cholesterol  95 0 - 100 mg/dL   TSH   Result Value Ref  Range    TSH 3.480 0.270 - 4.200 mIU/mL   Vitamin D 25 Hydroxy   Result Value Ref Range    25 Hydroxy, Vitamin D 31.2 ng/ml   CBC & Differential   Result Value Ref Range    WBC 4.69 (L) 4.80 - 10.80 10*3/mm3    RBC 4.56 4.20 - 5.40 10*6/mm3    Hemoglobin 14.0 12.0 - 16.0 g/dL    Hematocrit 42.5 37.0 - 47.0 %    MCV 93.2 81.0 - 99.0 fL    MCH 30.7 27.0 - 31.0 pg    MCHC 32.9 31.0 - 37.0 g/dL    RDW 13.2 11.5 - 14.5 %    Platelets 212 140 - 500 10*3/mm3    Neutrophil Rel % 65.9 45.0 - 70.0 %    Lymphocyte Rel % 23.0 20.0 - 45.0 %    Monocyte Rel % 7.2 3.0 - 8.0 %    Eosinophil Rel % 2.6 0.0 - 4.0 %    Basophil Rel % 0.9 0.0 - 2.0 %    Neutrophils Absolute 3.09 1.50 - 8.30 10*3/mm3    Lymphocytes Absolute 1.08 0.60 - 4.80 10*3/mm3    Monocytes Absolute 0.34 0.00 - 1.00 10*3/mm3    Eosinophils Absolute 0.12 0.10 - 0.30 10*3/mm3    Basophils Absolute 0.04 0.00 - 0.20 10*3/mm3    Immature Granulocyte Rel % 0.4 0.0 - 0.5 %    Immature Grans Absolute 0.02 0.00 - 0.03 10*3/mm3    nRBC 0.0 0.0 - 0.0 /100 WBC     Lower weight with diet and exercise  Continue current medications        Assessment/Plan   Marlena was seen today for hyperlipidemia and hypertension.    Diagnoses and all orders for this visit:    Mixed hyperlipidemia  -     CBC & Differential; Future  -     Comprehensive Metabolic Panel; Future  -     Lipid Panel; Future    Essential hypertension  -     CBC & Differential; Future  -     Comprehensive Metabolic Panel; Future  -     Lipid Panel; Future  -     TSH; Future    Vitamin D deficiency  -     CBC & Differential; Future  -     Vitamin D 25 Hydroxy; Future    Glucose intolerance (impaired glucose tolerance)  -     CBC & Differential; Future  -     Hemoglobin A1c; Future  -     Comprehensive Metabolic Panel; Future    Amenorrhea  -     Follicle Stimulating Hormone; Future  -     Estradiol; Future    Morbid exogenous obesity        Return in about 6 months (around 9/14/2018).    Scribed for Jordan Tavarez MD by  Daryl Haley. 03/14/2018    I, Jordan Tavarez MD personally performed the services described in this documentation, as scribed by Daryl Haley in my presence, and it is both accurate and complete

## 2018-03-15 PROBLEM — R73.02 GLUCOSE INTOLERANCE (IMPAIRED GLUCOSE TOLERANCE): Status: ACTIVE | Noted: 2018-03-15

## 2018-03-15 PROBLEM — E66.09 EXOGENOUS OBESITY: Status: RESOLVED | Noted: 2017-03-09 | Resolved: 2018-03-15

## 2018-03-15 PROBLEM — E66.01 MORBID EXOGENOUS OBESITY: Status: ACTIVE | Noted: 2018-03-15

## 2018-05-29 RX ORDER — ATENOLOL 50 MG/1
TABLET ORAL
Qty: 90 TABLET | Refills: 0 | Status: SHIPPED | OUTPATIENT
Start: 2018-05-29 | End: 2018-08-17 | Stop reason: SDUPTHER

## 2018-08-20 RX ORDER — ATENOLOL 50 MG/1
TABLET ORAL
Qty: 90 TABLET | Refills: 1 | Status: SHIPPED | OUTPATIENT
Start: 2018-08-20 | End: 2019-03-27

## 2018-08-22 DIAGNOSIS — E78.2 MIXED HYPERLIPIDEMIA: ICD-10-CM

## 2018-08-22 DIAGNOSIS — E55.9 VITAMIN D DEFICIENCY: ICD-10-CM

## 2018-08-22 DIAGNOSIS — R73.02 GLUCOSE INTOLERANCE (IMPAIRED GLUCOSE TOLERANCE): ICD-10-CM

## 2018-08-22 DIAGNOSIS — I10 ESSENTIAL HYPERTENSION: ICD-10-CM

## 2018-08-22 DIAGNOSIS — N91.2 AMENORRHEA: ICD-10-CM

## 2018-09-13 LAB
25(OH)D3+25(OH)D2 SERPL-MCNC: 42.6 NG/ML
ALBUMIN SERPL-MCNC: 4.2 G/DL (ref 3.5–5.2)
ALBUMIN/GLOB SERPL: 1.6 G/DL
ALP SERPL-CCNC: 43 U/L (ref 40–129)
ALT SERPL-CCNC: 43 U/L (ref 5–33)
AST SERPL-CCNC: 24 U/L (ref 5–32)
BASOPHILS # BLD AUTO: 0.04 10*3/MM3 (ref 0–0.2)
BASOPHILS NFR BLD AUTO: 0.9 % (ref 0–2)
BILIRUB SERPL-MCNC: 0.3 MG/DL (ref 0.2–1.2)
BUN SERPL-MCNC: 13 MG/DL (ref 8–23)
BUN/CREAT SERPL: 16.9 (ref 7–25)
CALCIUM SERPL-MCNC: 9.6 MG/DL (ref 8.8–10.5)
CHLORIDE SERPL-SCNC: 104 MMOL/L (ref 98–107)
CHOLEST SERPL-MCNC: 204 MG/DL (ref 0–200)
CO2 SERPL-SCNC: 27 MMOL/L (ref 22–29)
CREAT SERPL-MCNC: 0.77 MG/DL (ref 0.57–1)
EOSINOPHIL # BLD AUTO: 0.13 10*3/MM3 (ref 0.1–0.3)
EOSINOPHIL NFR BLD AUTO: 2.9 % (ref 0–4)
ERYTHROCYTE [DISTWIDTH] IN BLOOD BY AUTOMATED COUNT: 13.3 % (ref 11.5–14.5)
ESTRADIOL SERPL-MCNC: 13.6 PG/ML
FSH SERPL-ACNC: 37 MIU/ML
GLOBULIN SER CALC-MCNC: 2.6 GM/DL
GLUCOSE SERPL-MCNC: 162 MG/DL (ref 65–99)
HBA1C MFR BLD: 5.9 % (ref 4.8–5.6)
HCT VFR BLD AUTO: 42.4 % (ref 37–47)
HDLC SERPL-MCNC: 45 MG/DL (ref 40–60)
HGB BLD-MCNC: 13.4 G/DL (ref 12–16)
IMM GRANULOCYTES # BLD: 0.01 10*3/MM3 (ref 0–0.03)
IMM GRANULOCYTES NFR BLD: 0.2 % (ref 0–0.5)
LDLC SERPL CALC-MCNC: 112 MG/DL (ref 0–100)
LYMPHOCYTES # BLD AUTO: 1.01 10*3/MM3 (ref 0.6–4.8)
LYMPHOCYTES NFR BLD AUTO: 22.5 % (ref 20–45)
MCH RBC QN AUTO: 30.2 PG (ref 27–31)
MCHC RBC AUTO-ENTMCNC: 31.6 G/DL (ref 31–37)
MCV RBC AUTO: 95.5 FL (ref 81–99)
MONOCYTES # BLD AUTO: 0.44 10*3/MM3 (ref 0–1)
MONOCYTES NFR BLD AUTO: 9.8 % (ref 3–8)
NEUTROPHILS # BLD AUTO: 2.85 10*3/MM3 (ref 1.5–8.3)
NEUTROPHILS NFR BLD AUTO: 63.7 % (ref 45–70)
NRBC BLD AUTO-RTO: 0 /100 WBC (ref 0–0)
PLATELET # BLD AUTO: 244 10*3/MM3 (ref 140–500)
POTASSIUM SERPL-SCNC: 4.8 MMOL/L (ref 3.5–5.2)
PROT SERPL-MCNC: 6.8 G/DL (ref 6–8.5)
RBC # BLD AUTO: 4.44 10*6/MM3 (ref 4.2–5.4)
SODIUM SERPL-SCNC: 141 MMOL/L (ref 136–145)
TRIGL SERPL-MCNC: 237 MG/DL (ref 0–150)
TSH SERPL DL<=0.005 MIU/L-ACNC: 4.57 MIU/ML (ref 0.27–4.2)
VLDLC SERPL CALC-MCNC: 47.4 MG/DL (ref 7–27)
WBC # BLD AUTO: 4.48 10*3/MM3 (ref 4.8–10.8)

## 2018-09-24 ENCOUNTER — OFFICE VISIT (OUTPATIENT)
Dept: FAMILY MEDICINE CLINIC | Facility: CLINIC | Age: 58
End: 2018-09-24

## 2018-09-24 VITALS
RESPIRATION RATE: 16 BRPM | BODY MASS INDEX: 40.6 KG/M2 | WEIGHT: 252.6 LBS | OXYGEN SATURATION: 96 % | DIASTOLIC BLOOD PRESSURE: 82 MMHG | TEMPERATURE: 98 F | SYSTOLIC BLOOD PRESSURE: 130 MMHG | HEIGHT: 66 IN | HEART RATE: 84 BPM

## 2018-09-24 DIAGNOSIS — R73.02 GLUCOSE INTOLERANCE (IMPAIRED GLUCOSE TOLERANCE): ICD-10-CM

## 2018-09-24 DIAGNOSIS — E55.9 VITAMIN D DEFICIENCY: ICD-10-CM

## 2018-09-24 DIAGNOSIS — E66.01 MORBID EXOGENOUS OBESITY (HCC): ICD-10-CM

## 2018-09-24 DIAGNOSIS — E03.9 ACQUIRED HYPOTHYROIDISM: ICD-10-CM

## 2018-09-24 DIAGNOSIS — I10 ESSENTIAL HYPERTENSION: Primary | ICD-10-CM

## 2018-09-24 DIAGNOSIS — E78.2 MIXED HYPERLIPIDEMIA: ICD-10-CM

## 2018-09-24 PROCEDURE — 99213 OFFICE O/P EST LOW 20 MIN: CPT | Performed by: FAMILY MEDICINE

## 2018-09-24 NOTE — PROGRESS NOTES
Subjective   Marlena Waite is a 58 y.o. female with   Chief Complaint   Patient presents with   • Follow-up     on labs   .    History of Present Illness     Pt is a 59 yo white female who presents today for follow up on recent lab work done for unstable HLD, unstable glucose intolerance, unstable TSH and unstable Vit D Def.  At the time of today's appointment pt's is with stable HTN also.  Pt has a history of morbid exogenous obesity, she has gained 12 pounds since March of 2017.  Pt is currently prescribed Atenolol which she tolerates well.  Pt does still have a uterus and an occasional menses usually about every 2-3 months.  Recent lab work indicates she is lizz menopausal.  Pt states she is doing well and has no further complaints at this time.    The following portions of the patient's history were reviewed and updated as appropriate: allergies, current medications, past family history, past medical history, past social history, past surgical history and problem list.    Review of Systems   Constitutional:        Morbid Exogenous Obesity   Cardiovascular: Negative for chest pain, palpitations and leg swelling.        HTN  HLD   Endocrine: Negative for cold intolerance and heat intolerance.        Glucose Intolerance  Vit D Def   All other systems reviewed and are negative.      Objective     Vitals:    09/24/18 0732   BP: 130/82   Pulse: 84   Resp: 16   Temp: 98 °F (36.7 °C)   SpO2: 96%     BP Readings from Last 3 Encounters:   09/24/18 130/82   03/14/18 122/80   09/05/17 120/80      Wt Readings from Last 3 Encounters:   09/24/18 115 kg (252 lb 9.6 oz)   03/14/18 114 kg (251 lb)   09/05/17 111 kg (245 lb)      No visits with results within 2 Week(s) from this visit.   Latest known visit with results is:   Orders Only on 08/22/2018   Component Date Value Ref Range Status   • WBC 09/12/2018 4.48* 4.80 - 10.80 10*3/mm3 Final   • RBC 09/12/2018 4.44  4.20 - 5.40 10*6/mm3 Final   • Hemoglobin 09/12/2018 13.4  12.0  - 16.0 g/dL Final   • Hematocrit 09/12/2018 42.4  37.0 - 47.0 % Final   • MCV 09/12/2018 95.5  81.0 - 99.0 fL Final   • MCH 09/12/2018 30.2  27.0 - 31.0 pg Final   • MCHC 09/12/2018 31.6  31.0 - 37.0 g/dL Final   • RDW 09/12/2018 13.3  11.5 - 14.5 % Final   • Platelets 09/12/2018 244  140 - 500 10*3/mm3 Final   • Neutrophil Rel % 09/12/2018 63.7  45.0 - 70.0 % Final   • Lymphocyte Rel % 09/12/2018 22.5  20.0 - 45.0 % Final   • Monocyte Rel % 09/12/2018 9.8* 3.0 - 8.0 % Final   • Eosinophil Rel % 09/12/2018 2.9  0.0 - 4.0 % Final   • Basophil Rel % 09/12/2018 0.9  0.0 - 2.0 % Final   • Neutrophils Absolute 09/12/2018 2.85  1.50 - 8.30 10*3/mm3 Final   • Lymphocytes Absolute 09/12/2018 1.01  0.60 - 4.80 10*3/mm3 Final   • Monocytes Absolute 09/12/2018 0.44  0.00 - 1.00 10*3/mm3 Final   • Eosinophils Absolute 09/12/2018 0.13  0.10 - 0.30 10*3/mm3 Final   • Basophils Absolute 09/12/2018 0.04  0.00 - 0.20 10*3/mm3 Final   • Immature Granulocyte Rel % 09/12/2018 0.2  0.0 - 0.5 % Final   • Immature Grans Absolute 09/12/2018 0.01  0.00 - 0.03 10*3/mm3 Final   • nRBC 09/12/2018 0.0  0.0 - 0.0 /100 WBC Final   • Hemoglobin A1C 09/12/2018 5.90* 4.80 - 5.60 % Final   • Glucose 09/12/2018 162* 65 - 99 mg/dL Final   • BUN 09/12/2018 13  8 - 23 mg/dL Final   • Creatinine 09/12/2018 0.77  0.57 - 1.00 mg/dL Final   • eGFR Non  Am 09/12/2018 76  >60 mL/min/1.73 Final   • eGFR African Am 09/12/2018 93  >60 mL/min/1.73 Final   • BUN/Creatinine Ratio 09/12/2018 16.9  7.0 - 25.0 Final   • Sodium 09/12/2018 141  136 - 145 mmol/L Final   • Potassium 09/12/2018 4.8  3.5 - 5.2 mmol/L Final   • Chloride 09/12/2018 104  98 - 107 mmol/L Final   • Total CO2 09/12/2018 27.0  22.0 - 29.0 mmol/L Final   • Calcium 09/12/2018 9.6  8.8 - 10.5 mg/dL Final   • Total Protein 09/12/2018 6.8  6.0 - 8.5 g/dL Final   • Albumin 09/12/2018 4.20  3.50 - 5.20 g/dL Final   • Globulin 09/12/2018 2.6  gm/dL Final   • A/G Ratio 09/12/2018 1.6  g/dL Final   •  Total Bilirubin 09/12/2018 0.3  0.2 - 1.2 mg/dL Final   • Alkaline Phosphatase 09/12/2018 43  40 - 129 U/L Final   • AST (SGOT) 09/12/2018 24  5 - 32 U/L Final   • ALT (SGPT) 09/12/2018 43* 5 - 33 U/L Final   • Total Cholesterol 09/12/2018 204* 0 - 200 mg/dL Final   • Triglycerides 09/12/2018 237* 0 - 150 mg/dL Final   • HDL Cholesterol 09/12/2018 45  40 - 60 mg/dL Final   • VLDL Cholesterol 09/12/2018 47.4* 7 - 27 mg/dL Final   • LDL Cholesterol  09/12/2018 112* 0 - 100 mg/dL Final   • TSH 09/12/2018 4.570* 0.270 - 4.200 mIU/mL Final   • 25 Hydroxy, Vitamin D 09/12/2018 42.6  ng/ml Final    Comment: Reference Range for Total Vitamin D 25(OH)  Deficiency    <20.0 ng/mL  Insufficiency 21-29 ng/mL  Sufficiency   30-74 ng/mL     • FSH 09/12/2018 37.0  mIU/mL Final    Comment:                     Adult Female:                        Follicular phase      3.5 -  12.5                        Ovulation phase       4.7 -  21.5                        Luteal phase          1.7 -   7.7                        Postmenopausal       25.8 - 134.8     • Estradiol 09/12/2018 13.6  pg/mL Final    Comment:                     Adult Female:                        Follicular phase   12.5 -   166.0                        Ovulation phase    85.8 -   498.0                        Luteal phase       43.8 -   211.0                        Postmenopausal     <6.0 -    54.7                      Pregnancy                        1st trimester     215.0 - >4300.0                      Girls (1-10 years)    6.0 -    27.0  [employer] ECLIA methodology           Physical Exam   Constitutional: She is oriented to person, place, and time. She appears well-developed and well-nourished.   HENT:   Head: Normocephalic and atraumatic.   Neck: Trachea normal and phonation normal. Neck supple. Normal carotid pulses present. Carotid bruit is not present. No thyroid mass and no thyromegaly present.   Cardiovascular: Normal rate, regular rhythm and normal heart sounds.   Exam reveals no gallop and no friction rub.    No murmur heard.  Pulmonary/Chest: Effort normal and breath sounds normal. No respiratory distress. She has no decreased breath sounds. She has no wheezes. She has no rhonchi. She has no rales.   Lymphadenopathy:     She has no cervical adenopathy.   Neurological: She is alert and oriented to person, place, and time.   Skin: Skin is warm and dry. No rash noted.   Psychiatric: She has a normal mood and affect. Her speech is normal and behavior is normal. Judgment and thought content normal. Cognition and memory are normal.   Nursing note and vitals reviewed.      Assessment/Plan   Marlena was seen today for follow-up.    Diagnoses and all orders for this visit:    Essential hypertension  -     CBC & Differential; Future  -     Comprehensive Metabolic Panel; Future  -     TSH; Future    Mixed hyperlipidemia  -     Lipid Panel; Future    Glucose intolerance (impaired glucose tolerance)  -     CBC & Differential; Future  -     Comprehensive Metabolic Panel; Future  -     Hemoglobin A1c; Future    Vitamin D deficiency  -     Vitamin D 25 Hydroxy; Future    Morbid exogenous obesity (CMS/HCC)  -     TSH; Future    Acquired hypothyroidism      Patient Instructions     Results for orders placed or performed in visit on 08/22/18   Hemoglobin A1c   Result Value Ref Range    Hemoglobin A1C 5.90 (H) 4.80 - 5.60 %   Comprehensive Metabolic Panel   Result Value Ref Range    Glucose 162 (H) 65 - 99 mg/dL    BUN 13 8 - 23 mg/dL    Creatinine 0.77 0.57 - 1.00 mg/dL    eGFR Non African Am 76 >60 mL/min/1.73    eGFR African Am 93 >60 mL/min/1.73    BUN/Creatinine Ratio 16.9 7.0 - 25.0    Sodium 141 136 - 145 mmol/L    Potassium 4.8 3.5 - 5.2 mmol/L    Chloride 104 98 - 107 mmol/L    Total CO2 27.0 22.0 - 29.0 mmol/L    Calcium 9.6 8.8 - 10.5 mg/dL    Total Protein 6.8 6.0 - 8.5 g/dL    Albumin 4.20 3.50 - 5.20 g/dL    Globulin 2.6 gm/dL    A/G Ratio 1.6 g/dL    Total Bilirubin 0.3 0.2 -  1.2 mg/dL    Alkaline Phosphatase 43 40 - 129 U/L    AST (SGOT) 24 5 - 32 U/L    ALT (SGPT) 43 (H) 5 - 33 U/L   Lipid Panel   Result Value Ref Range    Total Cholesterol 204 (H) 0 - 200 mg/dL    Triglycerides 237 (H) 0 - 150 mg/dL    HDL Cholesterol 45 40 - 60 mg/dL    VLDL Cholesterol 47.4 (H) 7 - 27 mg/dL    LDL Cholesterol  112 (H) 0 - 100 mg/dL   TSH   Result Value Ref Range    TSH 4.570 (H) 0.270 - 4.200 mIU/mL   Vitamin D 25 Hydroxy   Result Value Ref Range    25 Hydroxy, Vitamin D 42.6 ng/ml   Follicle Stimulating Hormone   Result Value Ref Range    FSH 37.0 mIU/mL   Estradiol   Result Value Ref Range    Estradiol 13.6 pg/mL   CBC & Differential   Result Value Ref Range    WBC 4.48 (L) 4.80 - 10.80 10*3/mm3    RBC 4.44 4.20 - 5.40 10*6/mm3    Hemoglobin 13.4 12.0 - 16.0 g/dL    Hematocrit 42.4 37.0 - 47.0 %    MCV 95.5 81.0 - 99.0 fL    MCH 30.2 27.0 - 31.0 pg    MCHC 31.6 31.0 - 37.0 g/dL    RDW 13.3 11.5 - 14.5 %    Platelets 244 140 - 500 10*3/mm3    Neutrophil Rel % 63.7 45.0 - 70.0 %    Lymphocyte Rel % 22.5 20.0 - 45.0 %    Monocyte Rel % 9.8 (H) 3.0 - 8.0 %    Eosinophil Rel % 2.9 0.0 - 4.0 %    Basophil Rel % 0.9 0.0 - 2.0 %    Neutrophils Absolute 2.85 1.50 - 8.30 10*3/mm3    Lymphocytes Absolute 1.01 0.60 - 4.80 10*3/mm3    Monocytes Absolute 0.44 0.00 - 1.00 10*3/mm3    Eosinophils Absolute 0.13 0.10 - 0.30 10*3/mm3    Basophils Absolute 0.04 0.00 - 0.20 10*3/mm3    Immature Granulocyte Rel % 0.2 0.0 - 0.5 %    Immature Grans Absolute 0.01 0.00 - 0.03 10*3/mm3    nRBC 0.0 0.0 - 0.0 /100 WBC     Consider starting Vitamin D3 4000 IU daily.  Watch for symptoms such as increasing amounts of hair loss, dry skin or bowel changes which could be due to the thyroid.  Continue current medication, work on diet and exercise to promote weight loss.      Return in about 6 months (around 3/24/2019).    Scribed for Jordan Tavarez MD by Keyanna Guan CMA. 09/24/2018    IJordan MD personally performed the  services described in this documentation, as scribed by Keyanna Guan CMA in my presence, and it is both accurate and complete

## 2018-09-24 NOTE — PATIENT INSTRUCTIONS
Results for orders placed or performed in visit on 08/22/18   Hemoglobin A1c   Result Value Ref Range    Hemoglobin A1C 5.90 (H) 4.80 - 5.60 %   Comprehensive Metabolic Panel   Result Value Ref Range    Glucose 162 (H) 65 - 99 mg/dL    BUN 13 8 - 23 mg/dL    Creatinine 0.77 0.57 - 1.00 mg/dL    eGFR Non African Am 76 >60 mL/min/1.73    eGFR African Am 93 >60 mL/min/1.73    BUN/Creatinine Ratio 16.9 7.0 - 25.0    Sodium 141 136 - 145 mmol/L    Potassium 4.8 3.5 - 5.2 mmol/L    Chloride 104 98 - 107 mmol/L    Total CO2 27.0 22.0 - 29.0 mmol/L    Calcium 9.6 8.8 - 10.5 mg/dL    Total Protein 6.8 6.0 - 8.5 g/dL    Albumin 4.20 3.50 - 5.20 g/dL    Globulin 2.6 gm/dL    A/G Ratio 1.6 g/dL    Total Bilirubin 0.3 0.2 - 1.2 mg/dL    Alkaline Phosphatase 43 40 - 129 U/L    AST (SGOT) 24 5 - 32 U/L    ALT (SGPT) 43 (H) 5 - 33 U/L   Lipid Panel   Result Value Ref Range    Total Cholesterol 204 (H) 0 - 200 mg/dL    Triglycerides 237 (H) 0 - 150 mg/dL    HDL Cholesterol 45 40 - 60 mg/dL    VLDL Cholesterol 47.4 (H) 7 - 27 mg/dL    LDL Cholesterol  112 (H) 0 - 100 mg/dL   TSH   Result Value Ref Range    TSH 4.570 (H) 0.270 - 4.200 mIU/mL   Vitamin D 25 Hydroxy   Result Value Ref Range    25 Hydroxy, Vitamin D 42.6 ng/ml   Follicle Stimulating Hormone   Result Value Ref Range    FSH 37.0 mIU/mL   Estradiol   Result Value Ref Range    Estradiol 13.6 pg/mL   CBC & Differential   Result Value Ref Range    WBC 4.48 (L) 4.80 - 10.80 10*3/mm3    RBC 4.44 4.20 - 5.40 10*6/mm3    Hemoglobin 13.4 12.0 - 16.0 g/dL    Hematocrit 42.4 37.0 - 47.0 %    MCV 95.5 81.0 - 99.0 fL    MCH 30.2 27.0 - 31.0 pg    MCHC 31.6 31.0 - 37.0 g/dL    RDW 13.3 11.5 - 14.5 %    Platelets 244 140 - 500 10*3/mm3    Neutrophil Rel % 63.7 45.0 - 70.0 %    Lymphocyte Rel % 22.5 20.0 - 45.0 %    Monocyte Rel % 9.8 (H) 3.0 - 8.0 %    Eosinophil Rel % 2.9 0.0 - 4.0 %    Basophil Rel % 0.9 0.0 - 2.0 %    Neutrophils Absolute 2.85 1.50 - 8.30 10*3/mm3    Lymphocytes  Absolute 1.01 0.60 - 4.80 10*3/mm3    Monocytes Absolute 0.44 0.00 - 1.00 10*3/mm3    Eosinophils Absolute 0.13 0.10 - 0.30 10*3/mm3    Basophils Absolute 0.04 0.00 - 0.20 10*3/mm3    Immature Granulocyte Rel % 0.2 0.0 - 0.5 %    Immature Grans Absolute 0.01 0.00 - 0.03 10*3/mm3    nRBC 0.0 0.0 - 0.0 /100 WBC     Consider starting Vitamin D3 4000 IU daily.  Watch for symptoms such as increasing amounts of hair loss, dry skin or bowel changes which could be due to the thyroid.  Continue current medication, work on diet and exercise to promote weight loss.

## 2019-01-07 RX ORDER — ATENOLOL 50 MG/1
50 TABLET ORAL DAILY
Qty: 90 TABLET | Refills: 0 | Status: SHIPPED | OUTPATIENT
Start: 2019-01-07 | End: 2019-04-01 | Stop reason: SDUPTHER

## 2019-02-13 DIAGNOSIS — R73.02 GLUCOSE INTOLERANCE (IMPAIRED GLUCOSE TOLERANCE): ICD-10-CM

## 2019-02-13 DIAGNOSIS — I10 ESSENTIAL HYPERTENSION: ICD-10-CM

## 2019-02-13 DIAGNOSIS — E55.9 VITAMIN D DEFICIENCY: ICD-10-CM

## 2019-02-13 DIAGNOSIS — E66.01 MORBID EXOGENOUS OBESITY (HCC): ICD-10-CM

## 2019-02-13 DIAGNOSIS — E78.2 MIXED HYPERLIPIDEMIA: ICD-10-CM

## 2019-03-22 LAB
25(OH)D3+25(OH)D2 SERPL-MCNC: 34.2 NG/ML (ref 30–100)
ALBUMIN SERPL-MCNC: 4.1 G/DL (ref 3.5–5.2)
ALBUMIN/GLOB SERPL: 1.8 G/DL
ALP SERPL-CCNC: 38 U/L (ref 39–117)
ALT SERPL-CCNC: 38 U/L (ref 1–33)
AST SERPL-CCNC: 19 U/L (ref 1–32)
BASOPHILS # BLD AUTO: 0.04 10*3/MM3 (ref 0–0.2)
BASOPHILS NFR BLD AUTO: 0.7 % (ref 0–1.5)
BILIRUB SERPL-MCNC: 0.5 MG/DL (ref 0.2–1.2)
BUN SERPL-MCNC: 16 MG/DL (ref 8–23)
BUN/CREAT SERPL: 19.8 (ref 7–25)
CALCIUM SERPL-MCNC: 10.5 MG/DL (ref 8.6–10.5)
CHLORIDE SERPL-SCNC: 102 MMOL/L (ref 98–107)
CHOLEST SERPL-MCNC: 190 MG/DL (ref 0–200)
CO2 SERPL-SCNC: 25.9 MMOL/L (ref 22–29)
CREAT SERPL-MCNC: 0.81 MG/DL (ref 0.57–1)
EOSINOPHIL # BLD AUTO: 0.14 10*3/MM3 (ref 0–0.4)
EOSINOPHIL NFR BLD AUTO: 2.6 % (ref 0.3–6.2)
ERYTHROCYTE [DISTWIDTH] IN BLOOD BY AUTOMATED COUNT: 13.2 % (ref 12.3–15.4)
GLOBULIN SER CALC-MCNC: 2.3 GM/DL
GLUCOSE SERPL-MCNC: 159 MG/DL (ref 65–99)
HBA1C MFR BLD: 6.1 % (ref 4.8–5.6)
HCT VFR BLD AUTO: 42.8 % (ref 34–46.6)
HDLC SERPL-MCNC: 36 MG/DL (ref 40–60)
HGB BLD-MCNC: 13.4 G/DL (ref 12–15.9)
IMM GRANULOCYTES # BLD AUTO: 0.02 10*3/MM3 (ref 0–0.05)
IMM GRANULOCYTES NFR BLD AUTO: 0.4 % (ref 0–0.5)
LDLC SERPL CALC-MCNC: 95 MG/DL (ref 0–100)
LYMPHOCYTES # BLD AUTO: 1.27 10*3/MM3 (ref 0.7–3.1)
LYMPHOCYTES NFR BLD AUTO: 23.4 % (ref 19.6–45.3)
MCH RBC QN AUTO: 29.6 PG (ref 26.6–33)
MCHC RBC AUTO-ENTMCNC: 31.3 G/DL (ref 31.5–35.7)
MCV RBC AUTO: 94.5 FL (ref 79–97)
MONOCYTES # BLD AUTO: 0.44 10*3/MM3 (ref 0.1–0.9)
MONOCYTES NFR BLD AUTO: 8.1 % (ref 5–12)
NEUTROPHILS # BLD AUTO: 3.51 10*3/MM3 (ref 1.4–7)
NEUTROPHILS NFR BLD AUTO: 64.8 % (ref 42.7–76)
NRBC BLD AUTO-RTO: 0 /100 WBC (ref 0–0)
PLATELET # BLD AUTO: 248 10*3/MM3 (ref 140–450)
POTASSIUM SERPL-SCNC: 4.6 MMOL/L (ref 3.5–5.2)
PROT SERPL-MCNC: 6.4 G/DL (ref 6–8.5)
RBC # BLD AUTO: 4.53 10*6/MM3 (ref 3.77–5.28)
SODIUM SERPL-SCNC: 140 MMOL/L (ref 136–145)
TRIGL SERPL-MCNC: 295 MG/DL (ref 0–150)
TSH SERPL DL<=0.005 MIU/L-ACNC: 3.79 MIU/ML (ref 0.27–4.2)
VLDLC SERPL CALC-MCNC: 59 MG/DL (ref 5–40)
WBC # BLD AUTO: 5.42 10*3/MM3 (ref 3.4–10.8)

## 2019-03-27 ENCOUNTER — OFFICE VISIT (OUTPATIENT)
Dept: FAMILY MEDICINE CLINIC | Facility: CLINIC | Age: 59
End: 2019-03-27

## 2019-03-27 VITALS
BODY MASS INDEX: 39.53 KG/M2 | SYSTOLIC BLOOD PRESSURE: 136 MMHG | HEART RATE: 69 BPM | HEIGHT: 66 IN | OXYGEN SATURATION: 97 % | DIASTOLIC BLOOD PRESSURE: 84 MMHG | WEIGHT: 246 LBS

## 2019-03-27 DIAGNOSIS — R73.02 GLUCOSE INTOLERANCE (IMPAIRED GLUCOSE TOLERANCE): ICD-10-CM

## 2019-03-27 DIAGNOSIS — E55.9 VITAMIN D DEFICIENCY: ICD-10-CM

## 2019-03-27 DIAGNOSIS — E78.2 MIXED HYPERLIPIDEMIA: ICD-10-CM

## 2019-03-27 DIAGNOSIS — I10 ESSENTIAL HYPERTENSION: Primary | ICD-10-CM

## 2019-03-27 DIAGNOSIS — E66.01 MORBID EXOGENOUS OBESITY (HCC): ICD-10-CM

## 2019-03-27 DIAGNOSIS — M79.672 BILATERAL FOOT PAIN: ICD-10-CM

## 2019-03-27 DIAGNOSIS — M79.671 BILATERAL FOOT PAIN: ICD-10-CM

## 2019-03-27 PROCEDURE — 99213 OFFICE O/P EST LOW 20 MIN: CPT | Performed by: FAMILY MEDICINE

## 2019-03-27 RX ORDER — CHOLECALCIFEROL (VITAMIN D3) 125 MCG
2 CAPSULE ORAL
COMMUNITY

## 2019-03-27 RX ORDER — FERROUS SULFATE 325(65) MG
325 TABLET ORAL
COMMUNITY

## 2019-03-27 RX ORDER — CHLORAL HYDRATE 500 MG
CAPSULE ORAL
COMMUNITY

## 2019-03-27 RX ORDER — ASPIRIN 325 MG
325 TABLET ORAL DAILY
COMMUNITY

## 2019-03-27 NOTE — PATIENT INSTRUCTIONS
Results for orders placed or performed in visit on 02/13/19   Vitamin D 25 Hydroxy   Result Value Ref Range    25 Hydroxy, Vitamin D 34.2 30.0 - 100.0 ng/ml   TSH   Result Value Ref Range    TSH 3.790 0.270 - 4.200 mIU/mL   Lipid Panel   Result Value Ref Range    Total Cholesterol 190 0 - 200 mg/dL    Triglycerides 295 (H) 0 - 150 mg/dL    HDL Cholesterol 36 (L) 40 - 60 mg/dL    VLDL Cholesterol 59 (H) 5 - 40 mg/dL    LDL Cholesterol  95 0 - 100 mg/dL   Hemoglobin A1c   Result Value Ref Range    Hemoglobin A1C 6.10 (H) 4.80 - 5.60 %   Comprehensive Metabolic Panel   Result Value Ref Range    Glucose 159 (H) 65 - 99 mg/dL    BUN 16 8 - 23 mg/dL    Creatinine 0.81 0.57 - 1.00 mg/dL    eGFR Non African Am 72 >60 mL/min/1.73    eGFR African Am 87 >60 mL/min/1.73    BUN/Creatinine Ratio 19.8 7.0 - 25.0    Sodium 140 136 - 145 mmol/L    Potassium 4.6 3.5 - 5.2 mmol/L    Chloride 102 98 - 107 mmol/L    Total CO2 25.9 22.0 - 29.0 mmol/L    Calcium 10.5 8.6 - 10.5 mg/dL    Total Protein 6.4 6.0 - 8.5 g/dL    Albumin 4.10 3.50 - 5.20 g/dL    Globulin 2.3 gm/dL    A/G Ratio 1.8 g/dL    Total Bilirubin 0.5 0.2 - 1.2 mg/dL    Alkaline Phosphatase 38 (L) 39 - 117 U/L    AST (SGOT) 19 1 - 32 U/L    ALT (SGPT) 38 (H) 1 - 33 U/L   CBC & Differential   Result Value Ref Range    WBC 5.42 3.40 - 10.80 10*3/mm3    RBC 4.53 3.77 - 5.28 10*6/mm3    Hemoglobin 13.4 12.0 - 15.9 g/dL    Hematocrit 42.8 34.0 - 46.6 %    MCV 94.5 79.0 - 97.0 fL    MCH 29.6 26.6 - 33.0 pg    MCHC 31.3 (L) 31.5 - 35.7 g/dL    RDW 13.2 12.3 - 15.4 %    Platelets 248 140 - 450 10*3/mm3    Neutrophil Rel % 64.8 42.7 - 76.0 %    Lymphocyte Rel % 23.4 19.6 - 45.3 %    Monocyte Rel % 8.1 5.0 - 12.0 %    Eosinophil Rel % 2.6 0.3 - 6.2 %    Basophil Rel % 0.7 0.0 - 1.5 %    Neutrophils Absolute 3.51 1.40 - 7.00 10*3/mm3    Lymphocytes Absolute 1.27 0.70 - 3.10 10*3/mm3    Monocytes Absolute 0.44 0.10 - 0.90 10*3/mm3    Eosinophils Absolute 0.14 0.00 - 0.40 10*3/mm3     Basophils Absolute 0.04 0.00 - 0.20 10*3/mm3    Immature Granulocyte Rel % 0.4 0.0 - 0.5 %    Immature Grans Absolute 0.02 0.00 - 0.05 10*3/mm3    nRBC 0.0 0.0 - 0.0 /100 WBC     Continue to watch sugar, fats and cholesterol in your diet.  Work on weight loss.

## 2019-03-27 NOTE — PROGRESS NOTES
"Subjective   Marlena Waite is a 58 y.o. female with   Chief Complaint   Patient presents with   • Hypertension   .    History of Present Illness     59 yo white female presents for follow up on stable HTN, unstable HLD, stable Glucose Intolerance and stable Vit D Def.  Pt has a history morbid exogenous obesity and she has lost 6 pounds since her last visit.  Pt is currently prescribed Atenolol 50 mg daily, along with several supplements including Iron 325 mg and Fish Oil 1000 mg daily.  She tolerates these medications well without any complaints.  Ms. Waite states she has started working at [employer] recently and she is struggling with bilateral foot pain.  She has bought inserts and a better pair of tennis shoes and is hoping this will improve.  Pt states that she is otherwise doing well and is without further acute complaints.    The following portions of the patient's history were reviewed and updated as appropriate: allergies, current medications, past family history, past medical history, past social history, past surgical history and problem list.    Review of Systems   Cardiovascular: Negative for chest pain, palpitations and leg swelling.        HTN  HLD   Endocrine: Negative for cold intolerance and heat intolerance.        Glucose Intolerance  Vit D Def   Musculoskeletal: Positive for arthralgias.        Bilateral foot pain   All other systems reviewed and are negative.      Objective     Vitals:    03/27/19 0734 03/27/19 0814   BP: 164/80 136/84   Pulse: 69    SpO2: 97%    Weight: 112 kg (246 lb)    Height: 166.4 cm (65.5\")      BP Readings from Last 3 Encounters:   03/27/19 136/84   09/24/18 130/82   03/14/18 122/80      Wt Readings from Last 3 Encounters:   03/27/19 112 kg (246 lb)   09/24/18 115 kg (252 lb 9.6 oz)   03/14/18 114 kg (251 lb)        Recent Results (from the past 168 hour(s))   Vitamin D 25 Hydroxy    Collection Time: 03/21/19  8:23 AM   Result Value Ref Range    25 Hydroxy, Vitamin D " 34.2 30.0 - 100.0 ng/ml   TSH    Collection Time: 03/21/19  8:23 AM   Result Value Ref Range    TSH 3.790 0.270 - 4.200 mIU/mL   Lipid Panel    Collection Time: 03/21/19  8:23 AM   Result Value Ref Range    Total Cholesterol 190 0 - 200 mg/dL    Triglycerides 295 (H) 0 - 150 mg/dL    HDL Cholesterol 36 (L) 40 - 60 mg/dL    VLDL Cholesterol 59 (H) 5 - 40 mg/dL    LDL Cholesterol  95 0 - 100 mg/dL   Hemoglobin A1c    Collection Time: 03/21/19  8:23 AM   Result Value Ref Range    Hemoglobin A1C 6.10 (H) 4.80 - 5.60 %   Comprehensive Metabolic Panel    Collection Time: 03/21/19  8:23 AM   Result Value Ref Range    Glucose 159 (H) 65 - 99 mg/dL    BUN 16 8 - 23 mg/dL    Creatinine 0.81 0.57 - 1.00 mg/dL    eGFR Non African Am 72 >60 mL/min/1.73    eGFR African Am 87 >60 mL/min/1.73    BUN/Creatinine Ratio 19.8 7.0 - 25.0    Sodium 140 136 - 145 mmol/L    Potassium 4.6 3.5 - 5.2 mmol/L    Chloride 102 98 - 107 mmol/L    Total CO2 25.9 22.0 - 29.0 mmol/L    Calcium 10.5 8.6 - 10.5 mg/dL    Total Protein 6.4 6.0 - 8.5 g/dL    Albumin 4.10 3.50 - 5.20 g/dL    Globulin 2.3 gm/dL    A/G Ratio 1.8 g/dL    Total Bilirubin 0.5 0.2 - 1.2 mg/dL    Alkaline Phosphatase 38 (L) 39 - 117 U/L    AST (SGOT) 19 1 - 32 U/L    ALT (SGPT) 38 (H) 1 - 33 U/L   CBC & Differential    Collection Time: 03/21/19  8:23 AM   Result Value Ref Range    WBC 5.42 3.40 - 10.80 10*3/mm3    RBC 4.53 3.77 - 5.28 10*6/mm3    Hemoglobin 13.4 12.0 - 15.9 g/dL    Hematocrit 42.8 34.0 - 46.6 %    MCV 94.5 79.0 - 97.0 fL    MCH 29.6 26.6 - 33.0 pg    MCHC 31.3 (L) 31.5 - 35.7 g/dL    RDW 13.2 12.3 - 15.4 %    Platelets 248 140 - 450 10*3/mm3    Neutrophil Rel % 64.8 42.7 - 76.0 %    Lymphocyte Rel % 23.4 19.6 - 45.3 %    Monocyte Rel % 8.1 5.0 - 12.0 %    Eosinophil Rel % 2.6 0.3 - 6.2 %    Basophil Rel % 0.7 0.0 - 1.5 %    Neutrophils Absolute 3.51 1.40 - 7.00 10*3/mm3    Lymphocytes Absolute 1.27 0.70 - 3.10 10*3/mm3    Monocytes Absolute 0.44 0.10 - 0.90  10*3/mm3    Eosinophils Absolute 0.14 0.00 - 0.40 10*3/mm3    Basophils Absolute 0.04 0.00 - 0.20 10*3/mm3    Immature Granulocyte Rel % 0.4 0.0 - 0.5 %    Immature Grans Absolute 0.02 0.00 - 0.05 10*3/mm3    nRBC 0.0 0.0 - 0.0 /100 WBC       Physical Exam   Constitutional: She is oriented to person, place, and time. She appears well-developed and well-nourished.   HENT:   Head: Normocephalic and atraumatic.   Neck: Trachea normal and phonation normal. Neck supple. Normal carotid pulses present. Carotid bruit is not present. No thyroid mass and no thyromegaly present.   Cardiovascular: Normal rate, regular rhythm and normal heart sounds. Exam reveals no gallop and no friction rub.   No murmur heard.  Pulmonary/Chest: Effort normal and breath sounds normal. No respiratory distress. She has no decreased breath sounds. She has no wheezes. She has no rhonchi. She has no rales.   Lymphadenopathy:     She has no cervical adenopathy.   Neurological: She is alert and oriented to person, place, and time.   Skin: Skin is warm and dry. No rash noted.   Psychiatric: She has a normal mood and affect. Her speech is normal and behavior is normal. Judgment and thought content normal. Cognition and memory are normal.   Nursing note and vitals reviewed.      Assessment/Plan   Marlena was seen today for hypertension.    Diagnoses and all orders for this visit:    Essential hypertension  -     Comprehensive Metabolic Panel; Future  -     TSH; Future    Mixed hyperlipidemia  -     CBC & Differential; Future  -     Comprehensive Metabolic Panel; Future  -     Lipid Panel; Future  -     TSH; Future    Glucose intolerance (impaired glucose tolerance)  -     Comprehensive Metabolic Panel; Future  -     Hemoglobin A1c; Future    Vitamin D deficiency  -     Vitamin D 25 Hydroxy; Future    Morbid exogenous obesity (CMS/HCC)  -     TSH; Future    Bilateral foot pain      Patient Instructions     Results for orders placed or performed in visit  on 02/13/19   Vitamin D 25 Hydroxy   Result Value Ref Range    25 Hydroxy, Vitamin D 34.2 30.0 - 100.0 ng/ml   TSH   Result Value Ref Range    TSH 3.790 0.270 - 4.200 mIU/mL   Lipid Panel   Result Value Ref Range    Total Cholesterol 190 0 - 200 mg/dL    Triglycerides 295 (H) 0 - 150 mg/dL    HDL Cholesterol 36 (L) 40 - 60 mg/dL    VLDL Cholesterol 59 (H) 5 - 40 mg/dL    LDL Cholesterol  95 0 - 100 mg/dL   Hemoglobin A1c   Result Value Ref Range    Hemoglobin A1C 6.10 (H) 4.80 - 5.60 %   Comprehensive Metabolic Panel   Result Value Ref Range    Glucose 159 (H) 65 - 99 mg/dL    BUN 16 8 - 23 mg/dL    Creatinine 0.81 0.57 - 1.00 mg/dL    eGFR Non African Am 72 >60 mL/min/1.73    eGFR African Am 87 >60 mL/min/1.73    BUN/Creatinine Ratio 19.8 7.0 - 25.0    Sodium 140 136 - 145 mmol/L    Potassium 4.6 3.5 - 5.2 mmol/L    Chloride 102 98 - 107 mmol/L    Total CO2 25.9 22.0 - 29.0 mmol/L    Calcium 10.5 8.6 - 10.5 mg/dL    Total Protein 6.4 6.0 - 8.5 g/dL    Albumin 4.10 3.50 - 5.20 g/dL    Globulin 2.3 gm/dL    A/G Ratio 1.8 g/dL    Total Bilirubin 0.5 0.2 - 1.2 mg/dL    Alkaline Phosphatase 38 (L) 39 - 117 U/L    AST (SGOT) 19 1 - 32 U/L    ALT (SGPT) 38 (H) 1 - 33 U/L   CBC & Differential   Result Value Ref Range    WBC 5.42 3.40 - 10.80 10*3/mm3    RBC 4.53 3.77 - 5.28 10*6/mm3    Hemoglobin 13.4 12.0 - 15.9 g/dL    Hematocrit 42.8 34.0 - 46.6 %    MCV 94.5 79.0 - 97.0 fL    MCH 29.6 26.6 - 33.0 pg    MCHC 31.3 (L) 31.5 - 35.7 g/dL    RDW 13.2 12.3 - 15.4 %    Platelets 248 140 - 450 10*3/mm3    Neutrophil Rel % 64.8 42.7 - 76.0 %    Lymphocyte Rel % 23.4 19.6 - 45.3 %    Monocyte Rel % 8.1 5.0 - 12.0 %    Eosinophil Rel % 2.6 0.3 - 6.2 %    Basophil Rel % 0.7 0.0 - 1.5 %    Neutrophils Absolute 3.51 1.40 - 7.00 10*3/mm3    Lymphocytes Absolute 1.27 0.70 - 3.10 10*3/mm3    Monocytes Absolute 0.44 0.10 - 0.90 10*3/mm3    Eosinophils Absolute 0.14 0.00 - 0.40 10*3/mm3    Basophils Absolute 0.04 0.00 - 0.20 10*3/mm3     Immature Granulocyte Rel % 0.4 0.0 - 0.5 %    Immature Grans Absolute 0.02 0.00 - 0.05 10*3/mm3    nRBC 0.0 0.0 - 0.0 /100 WBC     Continue to watch sugar, fats and cholesterol in your diet.  Work on weight loss.         Return in about 6 months (around 9/27/2019).    Scribed for Jordan Tavarez MD by Keyanna Guan CMA. 03/27/2019    I, Jordan Tavarez MD personally performed the services described in this documentation, as scribed by Keyanna Guan CMA in my presence, and it is both accurate and complete

## 2019-04-01 RX ORDER — ATENOLOL 50 MG/1
50 TABLET ORAL DAILY
Qty: 90 TABLET | Refills: 0 | Status: SHIPPED | OUTPATIENT
Start: 2019-04-01 | End: 2019-07-07 | Stop reason: SDUPTHER

## 2019-07-08 RX ORDER — ATENOLOL 50 MG/1
50 TABLET ORAL DAILY
Qty: 90 TABLET | Refills: 0 | Status: SHIPPED | OUTPATIENT
Start: 2019-07-08 | End: 2019-10-02 | Stop reason: SDUPTHER

## 2019-08-29 DIAGNOSIS — E66.01 MORBID EXOGENOUS OBESITY (HCC): ICD-10-CM

## 2019-08-29 DIAGNOSIS — R73.02 GLUCOSE INTOLERANCE (IMPAIRED GLUCOSE TOLERANCE): ICD-10-CM

## 2019-08-29 DIAGNOSIS — E78.2 MIXED HYPERLIPIDEMIA: ICD-10-CM

## 2019-08-29 DIAGNOSIS — E55.9 VITAMIN D DEFICIENCY: ICD-10-CM

## 2019-08-29 DIAGNOSIS — I10 ESSENTIAL HYPERTENSION: ICD-10-CM

## 2019-09-04 LAB
25(OH)D3+25(OH)D2 SERPL-MCNC: 45.7 NG/ML (ref 30–100)
ALBUMIN SERPL-MCNC: 4.3 G/DL (ref 3.5–5.2)
ALBUMIN/GLOB SERPL: 1.8 G/DL
ALP SERPL-CCNC: 48 U/L (ref 39–117)
ALT SERPL-CCNC: 25 U/L (ref 1–33)
AST SERPL-CCNC: 17 U/L (ref 1–32)
BASOPHILS # BLD AUTO: 0.03 10*3/MM3 (ref 0–0.2)
BASOPHILS NFR BLD AUTO: 0.7 % (ref 0–1.5)
BILIRUB SERPL-MCNC: 0.7 MG/DL (ref 0.2–1.2)
BUN SERPL-MCNC: 15 MG/DL (ref 8–23)
BUN/CREAT SERPL: 17.6 (ref 7–25)
CALCIUM SERPL-MCNC: 10.5 MG/DL (ref 8.6–10.5)
CHLORIDE SERPL-SCNC: 102 MMOL/L (ref 98–107)
CHOLEST SERPL-MCNC: 185 MG/DL (ref 0–200)
CO2 SERPL-SCNC: 26.7 MMOL/L (ref 22–29)
CREAT SERPL-MCNC: 0.85 MG/DL (ref 0.57–1)
EOSINOPHIL # BLD AUTO: 0.1 10*3/MM3 (ref 0–0.4)
EOSINOPHIL NFR BLD AUTO: 2.3 % (ref 0.3–6.2)
ERYTHROCYTE [DISTWIDTH] IN BLOOD BY AUTOMATED COUNT: 13.5 % (ref 12.3–15.4)
GLOBULIN SER CALC-MCNC: 2.4 GM/DL
GLUCOSE SERPL-MCNC: 127 MG/DL (ref 65–99)
HBA1C MFR BLD: 6.3 % (ref 4.8–5.6)
HCT VFR BLD AUTO: 44.7 % (ref 34–46.6)
HDLC SERPL-MCNC: 38 MG/DL (ref 40–60)
HGB BLD-MCNC: 14.2 G/DL (ref 12–15.9)
IMM GRANULOCYTES # BLD AUTO: 0.01 10*3/MM3 (ref 0–0.05)
IMM GRANULOCYTES NFR BLD AUTO: 0.2 % (ref 0–0.5)
LDLC SERPL CALC-MCNC: 99 MG/DL (ref 0–100)
LYMPHOCYTES # BLD AUTO: 1.11 10*3/MM3 (ref 0.7–3.1)
LYMPHOCYTES NFR BLD AUTO: 25.6 % (ref 19.6–45.3)
MCH RBC QN AUTO: 30 PG (ref 26.6–33)
MCHC RBC AUTO-ENTMCNC: 31.8 G/DL (ref 31.5–35.7)
MCV RBC AUTO: 94.5 FL (ref 79–97)
MONOCYTES # BLD AUTO: 0.35 10*3/MM3 (ref 0.1–0.9)
MONOCYTES NFR BLD AUTO: 8.1 % (ref 5–12)
NEUTROPHILS # BLD AUTO: 2.73 10*3/MM3 (ref 1.7–7)
NEUTROPHILS NFR BLD AUTO: 63.1 % (ref 42.7–76)
NRBC BLD AUTO-RTO: 0 /100 WBC (ref 0–0.2)
PLATELET # BLD AUTO: 225 10*3/MM3 (ref 140–450)
POTASSIUM SERPL-SCNC: 4.5 MMOL/L (ref 3.5–5.2)
PROT SERPL-MCNC: 6.7 G/DL (ref 6–8.5)
RBC # BLD AUTO: 4.73 10*6/MM3 (ref 3.77–5.28)
SODIUM SERPL-SCNC: 141 MMOL/L (ref 136–145)
TRIGL SERPL-MCNC: 239 MG/DL (ref 0–150)
TSH SERPL DL<=0.005 MIU/L-ACNC: 3.27 UIU/ML (ref 0.27–4.2)
VLDLC SERPL CALC-MCNC: 47.8 MG/DL
WBC # BLD AUTO: 4.33 10*3/MM3 (ref 3.4–10.8)

## 2019-09-10 ENCOUNTER — OFFICE VISIT (OUTPATIENT)
Dept: FAMILY MEDICINE CLINIC | Facility: CLINIC | Age: 59
End: 2019-09-10

## 2019-09-10 VITALS
HEART RATE: 64 BPM | HEIGHT: 66 IN | WEIGHT: 237 LBS | OXYGEN SATURATION: 96 % | SYSTOLIC BLOOD PRESSURE: 138 MMHG | BODY MASS INDEX: 38.09 KG/M2 | DIASTOLIC BLOOD PRESSURE: 84 MMHG

## 2019-09-10 DIAGNOSIS — R73.02 GLUCOSE INTOLERANCE (IMPAIRED GLUCOSE TOLERANCE): ICD-10-CM

## 2019-09-10 DIAGNOSIS — I10 ESSENTIAL HYPERTENSION: Primary | ICD-10-CM

## 2019-09-10 DIAGNOSIS — E78.2 MIXED HYPERLIPIDEMIA: ICD-10-CM

## 2019-09-10 DIAGNOSIS — E55.9 VITAMIN D DEFICIENCY: ICD-10-CM

## 2019-09-10 DIAGNOSIS — E66.01 MORBID EXOGENOUS OBESITY (HCC): ICD-10-CM

## 2019-09-10 PROCEDURE — 99213 OFFICE O/P EST LOW 20 MIN: CPT | Performed by: FAMILY MEDICINE

## 2019-09-14 NOTE — PROGRESS NOTES
Subjective   Marlena Waite is a 59 y.o. female with   Chief Complaint   Patient presents with   • Hypertension   • Hyperlipidemia   .    History of Present Illness   59-year-old white female here in follow-up for further management of essential hypertension and mixed lipidemia.  Medications include Tenormin at 50 mg daily as well as a 325 mg aspirin per day.  She has been trying to control her lipid status with diet alone.  She has a long history of exogenous obesity and has been losing weight most recently.  She has been doing more of the keto diet apparently is tolerable and successful.  She also has a history of vitamin D deficiency using over-the-counter vitamin D3.  She is also using ferrous sulfate with a distant past history of iron deficiency anemia.  Fasting labs have been acquired prior to this visit.  Patient is doing well and has had no acute complaints.  Specifically there is been no chest pain, shortness of air or decreased stamina.  The following portions of the patient's history were reviewed and updated as appropriate: allergies, current medications, past family history, past medical history, past social history, past surgical history and problem list.    Review of Systems   Constitutional:        Obesity, vitamin D deficiency   Cardiovascular: Negative for chest pain, palpitations and leg swelling.        Hypertension, hyperlipidemia   Hematological:        Past history of iron deficiency anemia       Objective     Vitals:    09/10/19 0736   BP: 138/84   Pulse: 64   SpO2: 96%       Recent Results (from the past 672 hour(s))   Vitamin D 25 Hydroxy    Collection Time: 09/03/19  9:05 AM   Result Value Ref Range    25 Hydroxy, Vitamin D 45.7 30.0 - 100.0 ng/ml   TSH    Collection Time: 09/03/19  9:05 AM   Result Value Ref Range    TSH 3.270 0.270 - 4.200 uIU/mL   Lipid Panel    Collection Time: 09/03/19  9:05 AM   Result Value Ref Range    Total Cholesterol 185 0 - 200 mg/dL    Triglycerides 239 (H) 0  - 150 mg/dL    HDL Cholesterol 38 (L) 40 - 60 mg/dL    VLDL Cholesterol 47.8 mg/dL    LDL Cholesterol  99 0 - 100 mg/dL   Hemoglobin A1c    Collection Time: 09/03/19  9:05 AM   Result Value Ref Range    Hemoglobin A1C 6.30 (H) 4.80 - 5.60 %   Comprehensive Metabolic Panel    Collection Time: 09/03/19  9:05 AM   Result Value Ref Range    Glucose 127 (H) 65 - 99 mg/dL    BUN 15 8 - 23 mg/dL    Creatinine 0.85 0.57 - 1.00 mg/dL    eGFR Non African Am 68 >60 mL/min/1.73    eGFR African Am 82 >60 mL/min/1.73    BUN/Creatinine Ratio 17.6 7.0 - 25.0    Sodium 141 136 - 145 mmol/L    Potassium 4.5 3.5 - 5.2 mmol/L    Chloride 102 98 - 107 mmol/L    Total CO2 26.7 22.0 - 29.0 mmol/L    Calcium 10.5 8.6 - 10.5 mg/dL    Total Protein 6.7 6.0 - 8.5 g/dL    Albumin 4.30 3.50 - 5.20 g/dL    Globulin 2.4 gm/dL    A/G Ratio 1.8 g/dL    Total Bilirubin 0.7 0.2 - 1.2 mg/dL    Alkaline Phosphatase 48 39 - 117 U/L    AST (SGOT) 17 1 - 32 U/L    ALT (SGPT) 25 1 - 33 U/L   CBC & Differential    Collection Time: 09/03/19  9:05 AM   Result Value Ref Range    WBC 4.33 3.40 - 10.80 10*3/mm3    RBC 4.73 3.77 - 5.28 10*6/mm3    Hemoglobin 14.2 12.0 - 15.9 g/dL    Hematocrit 44.7 34.0 - 46.6 %    MCV 94.5 79.0 - 97.0 fL    MCH 30.0 26.6 - 33.0 pg    MCHC 31.8 31.5 - 35.7 g/dL    RDW 13.5 12.3 - 15.4 %    Platelets 225 140 - 450 10*3/mm3    Neutrophil Rel % 63.1 42.7 - 76.0 %    Lymphocyte Rel % 25.6 19.6 - 45.3 %    Monocyte Rel % 8.1 5.0 - 12.0 %    Eosinophil Rel % 2.3 0.3 - 6.2 %    Basophil Rel % 0.7 0.0 - 1.5 %    Neutrophils Absolute 2.73 1.70 - 7.00 10*3/mm3    Lymphocytes Absolute 1.11 0.70 - 3.10 10*3/mm3    Monocytes Absolute 0.35 0.10 - 0.90 10*3/mm3    Eosinophils Absolute 0.10 0.00 - 0.40 10*3/mm3    Basophils Absolute 0.03 0.00 - 0.20 10*3/mm3    Immature Granulocyte Rel % 0.2 0.0 - 0.5 %    Immature Grans Absolute 0.01 0.00 - 0.05 10*3/mm3    nRBC 0.0 0.0 - 0.2 /100 WBC       Physical Exam   Constitutional: She is oriented to  person, place, and time. She appears well-developed and well-nourished.   Borderline morbid exogenous obesity-with noted weight loss   HENT:   Head: Normocephalic and atraumatic.   Neck: Trachea normal and phonation normal. Neck supple. Normal carotid pulses present. Carotid bruit is not present. No thyroid mass and no thyromegaly present.   Cardiovascular: Normal rate, regular rhythm and normal heart sounds. Exam reveals no gallop and no friction rub.   No murmur heard.  Pulmonary/Chest: Effort normal and breath sounds normal. No respiratory distress. She has no decreased breath sounds. She has no wheezes. She has no rhonchi. She has no rales.   Lymphadenopathy:     She has no cervical adenopathy.   Neurological: She is alert and oriented to person, place, and time.   Skin: Skin is warm and dry. No rash noted.   Psychiatric: She has a normal mood and affect. Her speech is normal and behavior is normal. Judgment and thought content normal. Cognition and memory are normal.   Nursing note and vitals reviewed.      Assessment/Plan   Marlena was seen today for hypertension and hyperlipidemia.    Diagnoses and all orders for this visit:    Essential hypertension  -     Lipid panel; Future  -     Hemoglobin A1c; Future  -     Comprehensive metabolic panel; Future  -     Vitamin D 25 hydroxy; Future  -     TSH; Future  -     CBC w AUTO Differential; Future    Mixed hyperlipidemia  -     Lipid panel; Future  -     Hemoglobin A1c; Future  -     Comprehensive metabolic panel; Future  -     Vitamin D 25 hydroxy; Future  -     TSH; Future  -     CBC w AUTO Differential; Future    Vitamin D deficiency  -     Lipid panel; Future  -     Hemoglobin A1c; Future  -     Comprehensive metabolic panel; Future  -     Vitamin D 25 hydroxy; Future  -     TSH; Future  -     CBC w AUTO Differential; Future    Glucose intolerance (impaired glucose tolerance)  -     Lipid panel; Future  -     Hemoglobin A1c; Future  -     Comprehensive  metabolic panel; Future  -     Vitamin D 25 hydroxy; Future  -     TSH; Future  -     CBC w AUTO Differential; Future    Morbid exogenous obesity (CMS/HCC)  -     Lipid panel; Future  -     Hemoglobin A1c; Future  -     Comprehensive metabolic panel; Future  -     Vitamin D 25 hydroxy; Future  -     TSH; Future  -     CBC w AUTO Differential; Future        Return in about 6 months (around 3/10/2020) for Recheck.

## 2019-09-14 NOTE — PATIENT INSTRUCTIONS
Weight loss is essential in regards to all of her medical problems.  She has been applauded for her efforts and very much encouraged to continue.  This will improve sugar status as well as lipid status and blood pressure control.  Anticipate follow-up in 6 months preceded by fasting labs.

## 2019-10-02 RX ORDER — ATENOLOL 50 MG/1
50 TABLET ORAL DAILY
Qty: 90 TABLET | Refills: 1 | Status: SHIPPED | OUTPATIENT
Start: 2019-10-02 | End: 2020-03-31 | Stop reason: SDUPTHER

## 2020-02-12 DIAGNOSIS — E78.2 MIXED HYPERLIPIDEMIA: ICD-10-CM

## 2020-02-12 DIAGNOSIS — E55.9 VITAMIN D DEFICIENCY: ICD-10-CM

## 2020-02-12 DIAGNOSIS — I10 ESSENTIAL HYPERTENSION: ICD-10-CM

## 2020-02-12 DIAGNOSIS — E66.01 MORBID EXOGENOUS OBESITY (HCC): ICD-10-CM

## 2020-02-12 DIAGNOSIS — R73.02 GLUCOSE INTOLERANCE (IMPAIRED GLUCOSE TOLERANCE): ICD-10-CM

## 2020-02-27 LAB
25(OH)D3+25(OH)D2 SERPL-MCNC: 49.7 NG/ML (ref 30–100)
ALBUMIN SERPL-MCNC: 4.2 G/DL (ref 3.5–5.2)
ALBUMIN/GLOB SERPL: 1.7 G/DL
ALP SERPL-CCNC: 58 U/L (ref 39–117)
ALT SERPL-CCNC: 35 U/L (ref 1–33)
AST SERPL-CCNC: 15 U/L (ref 1–32)
BASOPHILS # BLD AUTO: 0.03 10*3/MM3 (ref 0–0.2)
BASOPHILS NFR BLD AUTO: 0.6 % (ref 0–1.5)
BILIRUB SERPL-MCNC: 0.5 MG/DL (ref 0.2–1.2)
BUN SERPL-MCNC: 22 MG/DL (ref 8–23)
BUN/CREAT SERPL: 23.9 (ref 7–25)
CALCIUM SERPL-MCNC: 10.4 MG/DL (ref 8.6–10.5)
CHLORIDE SERPL-SCNC: 101 MMOL/L (ref 98–107)
CHOLEST SERPL-MCNC: 203 MG/DL (ref 0–200)
CO2 SERPL-SCNC: 28.2 MMOL/L (ref 22–29)
CREAT SERPL-MCNC: 0.92 MG/DL (ref 0.57–1)
EOSINOPHIL # BLD AUTO: 0.12 10*3/MM3 (ref 0–0.4)
EOSINOPHIL NFR BLD AUTO: 2.3 % (ref 0.3–6.2)
ERYTHROCYTE [DISTWIDTH] IN BLOOD BY AUTOMATED COUNT: 12.7 % (ref 12.3–15.4)
GLOBULIN SER CALC-MCNC: 2.5 GM/DL
GLUCOSE SERPL-MCNC: 168 MG/DL (ref 65–99)
HBA1C MFR BLD: 6.9 % (ref 4.8–5.6)
HCT VFR BLD AUTO: 43.2 % (ref 34–46.6)
HDLC SERPL-MCNC: 41 MG/DL (ref 40–60)
HGB BLD-MCNC: 14.4 G/DL (ref 12–15.9)
IMM GRANULOCYTES # BLD AUTO: 0.02 10*3/MM3 (ref 0–0.05)
IMM GRANULOCYTES NFR BLD AUTO: 0.4 % (ref 0–0.5)
LDLC SERPL CALC-MCNC: 118 MG/DL (ref 0–100)
LYMPHOCYTES # BLD AUTO: 1.2 10*3/MM3 (ref 0.7–3.1)
LYMPHOCYTES NFR BLD AUTO: 23.2 % (ref 19.6–45.3)
MCH RBC QN AUTO: 30.1 PG (ref 26.6–33)
MCHC RBC AUTO-ENTMCNC: 33.3 G/DL (ref 31.5–35.7)
MCV RBC AUTO: 90.4 FL (ref 79–97)
MONOCYTES # BLD AUTO: 0.48 10*3/MM3 (ref 0.1–0.9)
MONOCYTES NFR BLD AUTO: 9.3 % (ref 5–12)
NEUTROPHILS # BLD AUTO: 3.32 10*3/MM3 (ref 1.7–7)
NEUTROPHILS NFR BLD AUTO: 64.2 % (ref 42.7–76)
NRBC BLD AUTO-RTO: 0 /100 WBC (ref 0–0.2)
PLATELET # BLD AUTO: 272 10*3/MM3 (ref 140–450)
POTASSIUM SERPL-SCNC: 4.7 MMOL/L (ref 3.5–5.2)
PROT SERPL-MCNC: 6.7 G/DL (ref 6–8.5)
RBC # BLD AUTO: 4.78 10*6/MM3 (ref 3.77–5.28)
SODIUM SERPL-SCNC: 140 MMOL/L (ref 136–145)
TRIGL SERPL-MCNC: 218 MG/DL (ref 0–150)
TSH SERPL DL<=0.005 MIU/L-ACNC: 4.19 UIU/ML (ref 0.27–4.2)
VLDLC SERPL CALC-MCNC: 43.6 MG/DL
WBC # BLD AUTO: 5.17 10*3/MM3 (ref 3.4–10.8)

## 2020-03-04 ENCOUNTER — OFFICE VISIT (OUTPATIENT)
Dept: FAMILY MEDICINE CLINIC | Facility: CLINIC | Age: 60
End: 2020-03-04

## 2020-03-04 VITALS
HEART RATE: 69 BPM | BODY MASS INDEX: 38.09 KG/M2 | OXYGEN SATURATION: 95 % | SYSTOLIC BLOOD PRESSURE: 130 MMHG | WEIGHT: 237 LBS | DIASTOLIC BLOOD PRESSURE: 82 MMHG | HEIGHT: 66 IN

## 2020-03-04 DIAGNOSIS — E55.9 VITAMIN D DEFICIENCY: ICD-10-CM

## 2020-03-04 DIAGNOSIS — I10 ESSENTIAL HYPERTENSION: ICD-10-CM

## 2020-03-04 DIAGNOSIS — E78.2 MIXED HYPERLIPIDEMIA: ICD-10-CM

## 2020-03-04 DIAGNOSIS — E11.9 CONTROLLED TYPE 2 DIABETES MELLITUS WITHOUT COMPLICATION, WITHOUT LONG-TERM CURRENT USE OF INSULIN (HCC): Primary | ICD-10-CM

## 2020-03-04 DIAGNOSIS — E03.9 ACQUIRED HYPOTHYROIDISM: ICD-10-CM

## 2020-03-04 DIAGNOSIS — E66.01 MORBID EXOGENOUS OBESITY (HCC): ICD-10-CM

## 2020-03-04 PROBLEM — R73.02 GLUCOSE INTOLERANCE (IMPAIRED GLUCOSE TOLERANCE): Status: RESOLVED | Noted: 2018-03-15 | Resolved: 2020-03-04

## 2020-03-04 PROCEDURE — 99213 OFFICE O/P EST LOW 20 MIN: CPT | Performed by: FAMILY MEDICINE

## 2020-03-04 RX ORDER — LEVOTHYROXINE SODIUM 0.05 MG/1
50 TABLET ORAL DAILY
Qty: 90 TABLET | Refills: 1 | Status: SHIPPED | OUTPATIENT
Start: 2020-03-04 | End: 2020-06-10 | Stop reason: DRUGHIGH

## 2020-03-04 RX ORDER — BLOOD-GLUCOSE METER
KIT MISCELLANEOUS
Qty: 1 EACH | Refills: 0 | Status: SHIPPED | OUTPATIENT
Start: 2020-03-04

## 2020-03-06 PROBLEM — E03.9 ACQUIRED HYPOTHYROIDISM: Status: ACTIVE | Noted: 2020-03-06

## 2020-03-06 PROBLEM — E11.9 CONTROLLED TYPE 2 DIABETES MELLITUS WITHOUT COMPLICATION, WITHOUT LONG-TERM CURRENT USE OF INSULIN (HCC): Status: ACTIVE | Noted: 2020-03-06

## 2020-03-07 NOTE — PROGRESS NOTES
Subjective   Marlena Waite is a 59 y.o. female with   Chief Complaint   Patient presents with   • Hypertension   • Hyperlipidemia   .    History of Present Illness   59-year-old white female with known history of essential hypertension and hyperlipidemia here for further medical management.  Exogenous obesity has also been an issue as well as glucose intolerance.  Fasting labs have been acquired prior to this visit.  Current medications include atenolol 50 mg daily as well as over-the-counter vitamin D3 and a variety of other supplements and vitamins.  She is currently doing well and has no acute complaints.  The following portions of the patient's history were reviewed and updated as appropriate: allergies, current medications, past family history, past medical history, past social history, past surgical history and problem list.    Review of Systems   Constitutional:        Obese   Cardiovascular:        Hypertension, hyperlipidemia   Endocrine:        Glucose intolerance       Objective     Vitals:    03/04/20 0729   BP: 130/82   Pulse: 69   SpO2: 95%       Recent Results (from the past 672 hour(s))   CBC w AUTO Differential    Collection Time: 02/26/20  8:30 AM   Result Value Ref Range    WBC 5.17 3.40 - 10.80 10*3/mm3    RBC 4.78 3.77 - 5.28 10*6/mm3    Hemoglobin 14.4 12.0 - 15.9 g/dL    Hematocrit 43.2 34.0 - 46.6 %    MCV 90.4 79.0 - 97.0 fL    MCH 30.1 26.6 - 33.0 pg    MCHC 33.3 31.5 - 35.7 g/dL    RDW 12.7 12.3 - 15.4 %    Platelets 272 140 - 450 10*3/mm3    Neutrophil Rel % 64.2 42.7 - 76.0 %    Lymphocyte Rel % 23.2 19.6 - 45.3 %    Monocyte Rel % 9.3 5.0 - 12.0 %    Eosinophil Rel % 2.3 0.3 - 6.2 %    Basophil Rel % 0.6 0.0 - 1.5 %    Neutrophils Absolute 3.32 1.70 - 7.00 10*3/mm3    Lymphocytes Absolute 1.20 0.70 - 3.10 10*3/mm3    Monocytes Absolute 0.48 0.10 - 0.90 10*3/mm3    Eosinophils Absolute 0.12 0.00 - 0.40 10*3/mm3    Basophils Absolute 0.03 0.00 - 0.20 10*3/mm3    Immature Granulocyte Rel  % 0.4 0.0 - 0.5 %    Immature Grans Absolute 0.02 0.00 - 0.05 10*3/mm3    nRBC 0.0 0.0 - 0.2 /100 WBC   TSH    Collection Time: 02/26/20  8:30 AM   Result Value Ref Range    TSH 4.190 0.270 - 4.200 uIU/mL   Vitamin D 25 hydroxy    Collection Time: 02/26/20  8:30 AM   Result Value Ref Range    25 Hydroxy, Vitamin D 49.7 30.0 - 100.0 ng/ml   Comprehensive metabolic panel    Collection Time: 02/26/20  8:30 AM   Result Value Ref Range    Glucose 168 (H) 65 - 99 mg/dL    BUN 22 8 - 23 mg/dL    Creatinine 0.92 0.57 - 1.00 mg/dL    eGFR Non African Am 62 >60 mL/min/1.73    eGFR African Am 75 >60 mL/min/1.73    BUN/Creatinine Ratio 23.9 7.0 - 25.0    Sodium 140 136 - 145 mmol/L    Potassium 4.7 3.5 - 5.2 mmol/L    Chloride 101 98 - 107 mmol/L    Total CO2 28.2 22.0 - 29.0 mmol/L    Calcium 10.4 8.6 - 10.5 mg/dL    Total Protein 6.7 6.0 - 8.5 g/dL    Albumin 4.20 3.50 - 5.20 g/dL    Globulin 2.5 gm/dL    A/G Ratio 1.7 g/dL    Total Bilirubin 0.5 0.2 - 1.2 mg/dL    Alkaline Phosphatase 58 39 - 117 U/L    AST (SGOT) 15 1 - 32 U/L    ALT (SGPT) 35 (H) 1 - 33 U/L   Hemoglobin A1c    Collection Time: 02/26/20  8:30 AM   Result Value Ref Range    Hemoglobin A1C 6.90 (H) 4.80 - 5.60 %   Lipid panel    Collection Time: 02/26/20  8:30 AM   Result Value Ref Range    Total Cholesterol 203 (H) 0 - 200 mg/dL    Triglycerides 218 (H) 0 - 150 mg/dL    HDL Cholesterol 41 40 - 60 mg/dL    VLDL Cholesterol 43.6 mg/dL    LDL Cholesterol  118 (H) 0 - 100 mg/dL       Physical Exam   Constitutional: She is oriented to person, place, and time. She appears well-developed and well-nourished.   Morbidly obese with a BMI of 38.8.   HENT:   Head: Normocephalic and atraumatic.   Neck: Trachea normal and phonation normal. Neck supple. Normal carotid pulses present. Carotid bruit is not present. No thyroid mass and no thyromegaly present.   Cardiovascular: Normal rate, regular rhythm and normal heart sounds. Exam reveals no gallop and no friction rub.    No murmur heard.  Pulmonary/Chest: Effort normal and breath sounds normal. No respiratory distress. She has no decreased breath sounds. She has no wheezes. She has no rhonchi. She has no rales.   Lymphadenopathy:     She has no cervical adenopathy.   Neurological: She is alert and oriented to person, place, and time.   Skin: Skin is warm and dry. No rash noted.   Psychiatric: She has a normal mood and affect. Her speech is normal and behavior is normal. Judgment and thought content normal. Cognition and memory are normal.   Nursing note and vitals reviewed.      Assessment/Plan   Marlena was seen today for hypertension and hyperlipidemia.    Diagnoses and all orders for this visit:    Controlled type 2 diabetes mellitus without complication, without long-term current use of insulin (CMS/[facility])  -     SITagliptin-metFORMIN HCl ER (JANUMET XR)  MG tablet; 2 po q am  -     glucose blood test strip; USE TO CHECK BLOOD SUGAR ONCE DAILY    DX E11.9  -     glucose monitor monitoring kit; USE TO CHECK BLOOD SUGAR ONCE DAILY  -     Lancets Misc. kit; USE TO CHECK BLOOD SUGAR ONCE DAILY    Essential hypertension    Mixed hyperlipidemia    Morbid exogenous obesity (CMS/HCC)    Acquired hypothyroidism  -     levothyroxine (SYNTHROID) 50 MCG tablet; Take 1 tablet by mouth Daily.    Vitamin D deficiency        Return in about 4 weeks (around 4/1/2020) for Recheck.

## 2020-03-31 DIAGNOSIS — I10 ESSENTIAL HYPERTENSION: Primary | ICD-10-CM

## 2020-03-31 RX ORDER — ATENOLOL 50 MG/1
50 TABLET ORAL DAILY
Qty: 90 TABLET | Refills: 0 | Status: SHIPPED | OUTPATIENT
Start: 2020-03-31 | End: 2020-06-10 | Stop reason: SDUPTHER

## 2020-04-01 DIAGNOSIS — I10 ESSENTIAL HYPERTENSION: ICD-10-CM

## 2020-04-01 RX ORDER — ATENOLOL 50 MG/1
50 TABLET ORAL DAILY
Qty: 90 TABLET | Refills: 1 | OUTPATIENT
Start: 2020-04-01

## 2020-04-13 DIAGNOSIS — E11.9 CONTROLLED TYPE 2 DIABETES MELLITUS WITHOUT COMPLICATION, WITHOUT LONG-TERM CURRENT USE OF INSULIN (HCC): ICD-10-CM

## 2020-04-13 NOTE — TELEPHONE ENCOUNTER
Pharmacy called requesting refill on selected med.    [facility] [id number] confirmed

## 2020-04-14 DIAGNOSIS — E11.9 CONTROLLED TYPE 2 DIABETES MELLITUS WITHOUT COMPLICATION, WITHOUT LONG-TERM CURRENT USE OF INSULIN (HCC): ICD-10-CM

## 2020-05-07 DIAGNOSIS — E11.9 CONTROLLED TYPE 2 DIABETES MELLITUS WITHOUT COMPLICATION, WITHOUT LONG-TERM CURRENT USE OF INSULIN (HCC): ICD-10-CM

## 2020-05-08 ENCOUNTER — TELEPHONE (OUTPATIENT)
Dept: FAMILY MEDICINE CLINIC | Facility: CLINIC | Age: 60
End: 2020-05-08

## 2020-05-08 DIAGNOSIS — E11.9 CONTROLLED TYPE 2 DIABETES MELLITUS WITHOUT COMPLICATION, WITHOUT LONG-TERM CURRENT USE OF INSULIN (HCC): ICD-10-CM

## 2020-05-08 NOTE — TELEPHONE ENCOUNTER
PHARMACY REQUESTED TO GET A 90 DAY SUPPLY OF THE MEDICINE SITagliptin-metFORMIN HCl ER (Janumet XR)  MG tablet. PHARMACY STATES THAT PT HAS A CO-PAY CARD THAT MAKES THE PRICE THE SAME AS A 30 DAY SUPPLY. PHARMACY REQUESTED TO GET EITHER A 90 DAY WITH NO REFILL OR A 30 DAY WITH 2 REFILL. PLEASE ADVISE

## 2020-06-10 ENCOUNTER — OFFICE VISIT (OUTPATIENT)
Dept: FAMILY MEDICINE CLINIC | Facility: CLINIC | Age: 60
End: 2020-06-10

## 2020-06-10 VITALS
DIASTOLIC BLOOD PRESSURE: 86 MMHG | TEMPERATURE: 97.8 F | WEIGHT: 231 LBS | HEIGHT: 66 IN | SYSTOLIC BLOOD PRESSURE: 152 MMHG | OXYGEN SATURATION: 97 % | HEART RATE: 60 BPM | BODY MASS INDEX: 37.12 KG/M2

## 2020-06-10 DIAGNOSIS — E66.01 MORBID EXOGENOUS OBESITY (HCC): ICD-10-CM

## 2020-06-10 DIAGNOSIS — E11.9 CONTROLLED TYPE 2 DIABETES MELLITUS WITHOUT COMPLICATION, WITHOUT LONG-TERM CURRENT USE OF INSULIN (HCC): Primary | ICD-10-CM

## 2020-06-10 DIAGNOSIS — E55.9 VITAMIN D DEFICIENCY: ICD-10-CM

## 2020-06-10 DIAGNOSIS — E03.9 ACQUIRED HYPOTHYROIDISM: ICD-10-CM

## 2020-06-10 DIAGNOSIS — I10 ESSENTIAL HYPERTENSION: ICD-10-CM

## 2020-06-10 DIAGNOSIS — E78.2 MIXED HYPERLIPIDEMIA: ICD-10-CM

## 2020-06-10 PROCEDURE — 99213 OFFICE O/P EST LOW 20 MIN: CPT | Performed by: FAMILY MEDICINE

## 2020-06-10 RX ORDER — BLOOD-GLUCOSE METER
EACH MISCELLANEOUS
COMMUNITY
Start: 2020-03-04

## 2020-06-10 RX ORDER — LEVOTHYROXINE SODIUM 0.07 MG/1
75 TABLET ORAL DAILY
Qty: 30 TABLET | Refills: 2 | Status: SHIPPED | OUTPATIENT
Start: 2020-06-10 | End: 2020-09-03

## 2020-06-10 RX ORDER — ATENOLOL 50 MG/1
50 TABLET ORAL DAILY
Qty: 90 TABLET | Refills: 1 | Status: SHIPPED | OUTPATIENT
Start: 2020-06-10 | End: 2021-01-06

## 2020-06-10 RX ORDER — LORATADINE 5 MG/5 ML
SOLUTION, ORAL ORAL
COMMUNITY
Start: 2020-03-04

## 2020-06-10 NOTE — PROGRESS NOTES
Subjective   Marlena Waite is a 60 y.o. female with   Chief Complaint   Patient presents with   • Hypertension   • Diabetes   .    History of Present Illness   60-year-old white female with history of type II non-insulin-dependent diabetes mellitus, hypertension, and hypothyroidism here for further medical management.  Current medications include Janumet XR at 50/thousand using 2 daily.  Levothyroxine is also use of 50 mcg daily.  Patient uses atenolol at 50 mg daily.  She does use a host of over-the-counter supplements and vitamins.  She continues to struggle with her weight although she has lost 6 pounds since last visit.  She is still not exercising and admits that she could go a lot further and improving her diet.  Fasting labs have been acquired prior to this visit.  The following portions of the patient's history were reviewed and updated as appropriate: allergies, current medications, past family history, past medical history, past social history, past surgical history and problem list.    Review of Systems   Constitutional: Negative for fatigue.   Cardiovascular: Negative for chest pain.   Endocrine: Negative for polydipsia, polyphagia and polyuria.        Hypothyroidism, type II non-insulin-dependent diabetes mellitus   Skin: Negative for pallor.   Neurological: Negative for dizziness, tremors, seizures, speech difficulty, weakness and headaches.   Psychiatric/Behavioral: Negative for confusion. The patient is not nervous/anxious.        Objective     Vitals:    06/10/20 0920   BP: 152/86   Pulse: 60   Temp: 97.8 °F (36.6 °C)   SpO2: 97%       No results found for this or any previous visit (from the past 672 hour(s)).    Physical Exam   Constitutional: She is oriented to person, place, and time. She appears well-developed and well-nourished.   Morbidly obese with a BMI of 37.8   HENT:   Head: Normocephalic and atraumatic.   Neck: Trachea normal and phonation normal. Neck supple. Normal carotid pulses  present. Carotid bruit is not present. No thyroid mass and no thyromegaly present.   Cardiovascular: Normal rate, regular rhythm and normal heart sounds. Exam reveals no gallop and no friction rub.   No murmur heard.  Pulmonary/Chest: Effort normal and breath sounds normal. No respiratory distress. She has no decreased breath sounds. She has no wheezes. She has no rhonchi. She has no rales.   Lymphadenopathy:     She has no cervical adenopathy.   Neurological: She is alert and oriented to person, place, and time.   Skin: Skin is warm and dry. No rash noted.   Psychiatric: She has a normal mood and affect. Her speech is normal and behavior is normal. Judgment and thought content normal. Cognition and memory are normal.   Nursing note and vitals reviewed.    No visits with results within 12 Week(s) from this visit.   Latest known visit with results is:   Orders Only on 02/12/2020   Component Date Value Ref Range Status   • WBC 02/26/2020 5.17  3.40 - 10.80 10*3/mm3 Final   • RBC 02/26/2020 4.78  3.77 - 5.28 10*6/mm3 Final   • Hemoglobin 02/26/2020 14.4  12.0 - 15.9 g/dL Final   • Hematocrit 02/26/2020 43.2  34.0 - 46.6 % Final   • MCV 02/26/2020 90.4  79.0 - 97.0 fL Final   • MCH 02/26/2020 30.1  26.6 - 33.0 pg Final   • MCHC 02/26/2020 33.3  31.5 - 35.7 g/dL Final   • RDW 02/26/2020 12.7  12.3 - 15.4 % Final   • Platelets 02/26/2020 272  140 - 450 10*3/mm3 Final   • Neutrophil Rel % 02/26/2020 64.2  42.7 - 76.0 % Final   • Lymphocyte Rel % 02/26/2020 23.2  19.6 - 45.3 % Final   • Monocyte Rel % 02/26/2020 9.3  5.0 - 12.0 % Final   • Eosinophil Rel % 02/26/2020 2.3  0.3 - 6.2 % Final   • Basophil Rel % 02/26/2020 0.6  0.0 - 1.5 % Final   • Neutrophils Absolute 02/26/2020 3.32  1.70 - 7.00 10*3/mm3 Final   • Lymphocytes Absolute 02/26/2020 1.20  0.70 - 3.10 10*3/mm3 Final   • Monocytes Absolute 02/26/2020 0.48  0.10 - 0.90 10*3/mm3 Final   • Eosinophils Absolute 02/26/2020 0.12  0.00 - 0.40 10*3/mm3 Final   • Basophils  Absolute 02/26/2020 0.03  0.00 - 0.20 10*3/mm3 Final   • Immature Granulocyte Rel % 02/26/2020 0.4  0.0 - 0.5 % Final   • Immature Grans Absolute 02/26/2020 0.02  0.00 - 0.05 10*3/mm3 Final   • nRBC 02/26/2020 0.0  0.0 - 0.2 /100 WBC Final   • TSH 02/26/2020 4.190  0.270 - 4.200 uIU/mL Final   • 25 Hydroxy, Vitamin D 02/26/2020 49.7  30.0 - 100.0 ng/ml Final    Comment: Results may be falsely increased if patient taking Biotin.  Reference Range for Total Vitamin D 25(OH)  Deficiency <20.0 ng/mL  Insufficiency 21-29 ng/mL  Sufficiency  ng/mL  Toxicity >100 ng/ml     • Glucose 02/26/2020 168* 65 - 99 mg/dL Final   • BUN 02/26/2020 22  8 - 23 mg/dL Final   • Creatinine 02/26/2020 0.92  0.57 - 1.00 mg/dL Final   • eGFR Non African Am 02/26/2020 62  >60 mL/min/1.73 Final   • eGFR African Am 02/26/2020 75  >60 mL/min/1.73 Final   • BUN/Creatinine Ratio 02/26/2020 23.9  7.0 - 25.0 Final   • Sodium 02/26/2020 140  136 - 145 mmol/L Final   • Potassium 02/26/2020 4.7  3.5 - 5.2 mmol/L Final   • Chloride 02/26/2020 101  98 - 107 mmol/L Final   • Total CO2 02/26/2020 28.2  22.0 - 29.0 mmol/L Final   • Calcium 02/26/2020 10.4  8.6 - 10.5 mg/dL Final   • Total Protein 02/26/2020 6.7  6.0 - 8.5 g/dL Final   • Albumin 02/26/2020 4.20  3.50 - 5.20 g/dL Final   • Globulin 02/26/2020 2.5  gm/dL Final   • A/G Ratio 02/26/2020 1.7  g/dL Final   • Total Bilirubin 02/26/2020 0.5  0.2 - 1.2 mg/dL Final   • Alkaline Phosphatase 02/26/2020 58  39 - 117 U/L Final   • AST (SGOT) 02/26/2020 15  1 - 32 U/L Final   • ALT (SGPT) 02/26/2020 35* 1 - 33 U/L Final   • Hemoglobin A1C 02/26/2020 6.90* 4.80 - 5.60 % Final    Comment: Hemoglobin A1C Ranges:  Increased Risk for Diabetes  5.7% to 6.4%  Diabetes                     >= 6.5%  Diabetic Goal                < 7.0%     • Total Cholesterol 02/26/2020 203* 0 - 200 mg/dL Final   • Triglycerides 02/26/2020 218* 0 - 150 mg/dL Final   • HDL Cholesterol 02/26/2020 41  40 - 60 mg/dL Final   • VLDL  Cholesterol 02/26/2020 43.6  mg/dL Final   • LDL Cholesterol  02/26/2020 118* 0 - 100 mg/dL Final         Assessment/Plan   Marlena was seen today for hypertension and diabetes.    Diagnoses and all orders for this visit:    Controlled type 2 diabetes mellitus without complication, without long-term current use of insulin (CMS/[facility])  -     Lipid panel; Future  -     Hemoglobin A1c; Future  -     Comprehensive metabolic panel; Future  -     Vitamin D 25 hydroxy; Future  -     TSH; Future  -     CBC w AUTO Differential; Future    Acquired hypothyroidism  -     levothyroxine (Synthroid) 75 MCG tablet; Take 1 tablet by mouth Daily.  -     Lipid panel; Future  -     Hemoglobin A1c; Future  -     Comprehensive metabolic panel; Future  -     Vitamin D 25 hydroxy; Future  -     TSH; Future  -     CBC w AUTO Differential; Future    Vitamin D deficiency  -     Lipid panel; Future  -     Hemoglobin A1c; Future  -     Comprehensive metabolic panel; Future  -     Vitamin D 25 hydroxy; Future  -     TSH; Future  -     CBC w AUTO Differential; Future    Morbid exogenous obesity (CMS/[facility])  -     Lipid panel; Future  -     Hemoglobin A1c; Future  -     Comprehensive metabolic panel; Future  -     Vitamin D 25 hydroxy; Future  -     TSH; Future  -     CBC w AUTO Differential; Future    Mixed hyperlipidemia  -     Lipid panel; Future  -     Hemoglobin A1c; Future  -     Comprehensive metabolic panel; Future  -     Vitamin D 25 hydroxy; Future  -     TSH; Future  -     CBC w AUTO Differential; Future    Essential hypertension  -     atenolol (TENORMIN) 50 MG tablet; Take 1 tablet by mouth Daily.  -     Lipid panel; Future  -     Hemoglobin A1c; Future  -     Comprehensive metabolic panel; Future  -     Vitamin D 25 hydroxy; Future  -     TSH; Future  -     CBC w AUTO Differential; Future    Have had long discussion in regards to continued weight loss which will include a decreased calorie diet as well as lowering cholesterol in her  diet.  Blood pressure will be monitored with patient to return to this office in 2 to 3 weeks for blood pressure check.  Current medications will be continued without alteration.  Have discussed the use of cholesterol medication with a goal LDL of 70.  Have also discussed hemoglobin A1c which is still acceptable but must decrease to maintain in a stable place.    Return in about 6 months (around 12/10/2020) for Recheck.

## 2020-07-21 DIAGNOSIS — E03.9 ACQUIRED HYPOTHYROIDISM: Primary | ICD-10-CM

## 2020-07-23 LAB — TSH SERPL DL<=0.005 MIU/L-ACNC: 2.14 UIU/ML (ref 0.27–4.2)

## 2020-08-12 DIAGNOSIS — E11.9 CONTROLLED TYPE 2 DIABETES MELLITUS WITHOUT COMPLICATION, WITHOUT LONG-TERM CURRENT USE OF INSULIN (HCC): ICD-10-CM

## 2020-09-03 DIAGNOSIS — E03.9 ACQUIRED HYPOTHYROIDISM: ICD-10-CM

## 2020-09-03 RX ORDER — LEVOTHYROXINE SODIUM 0.07 MG/1
TABLET ORAL
Qty: 90 TABLET | Refills: 0 | Status: SHIPPED | OUTPATIENT
Start: 2020-09-03 | End: 2020-11-30

## 2020-10-06 DIAGNOSIS — E03.9 ACQUIRED HYPOTHYROIDISM: ICD-10-CM

## 2020-10-06 DIAGNOSIS — I10 ESSENTIAL HYPERTENSION: ICD-10-CM

## 2020-10-06 DIAGNOSIS — E78.2 MIXED HYPERLIPIDEMIA: ICD-10-CM

## 2020-10-06 DIAGNOSIS — E11.9 CONTROLLED TYPE 2 DIABETES MELLITUS WITHOUT COMPLICATION, WITHOUT LONG-TERM CURRENT USE OF INSULIN (HCC): ICD-10-CM

## 2020-10-06 DIAGNOSIS — E66.01 MORBID EXOGENOUS OBESITY (HCC): ICD-10-CM

## 2020-10-06 DIAGNOSIS — E55.9 VITAMIN D DEFICIENCY: ICD-10-CM

## 2020-10-09 LAB
25(OH)D3+25(OH)D2 SERPL-MCNC: 52.7 NG/ML (ref 30–100)
ALBUMIN SERPL-MCNC: 4.4 G/DL (ref 3.5–5.2)
ALBUMIN/GLOB SERPL: 2.4 G/DL
ALP SERPL-CCNC: 53 U/L (ref 39–117)
ALT SERPL-CCNC: 34 U/L (ref 1–33)
AST SERPL-CCNC: 17 U/L (ref 1–32)
BASOPHILS # BLD AUTO: 0.03 10*3/MM3 (ref 0–0.2)
BASOPHILS NFR BLD AUTO: 0.7 % (ref 0–1.5)
BILIRUB SERPL-MCNC: 0.6 MG/DL (ref 0–1.2)
BUN SERPL-MCNC: 15 MG/DL (ref 8–23)
BUN/CREAT SERPL: 17 (ref 7–25)
CALCIUM SERPL-MCNC: 9.9 MG/DL (ref 8.6–10.5)
CHLORIDE SERPL-SCNC: 105 MMOL/L (ref 98–107)
CHOLEST SERPL-MCNC: 178 MG/DL (ref 0–200)
CO2 SERPL-SCNC: 27.9 MMOL/L (ref 22–29)
CREAT SERPL-MCNC: 0.88 MG/DL (ref 0.57–1)
EOSINOPHIL # BLD AUTO: 0.11 10*3/MM3 (ref 0–0.4)
EOSINOPHIL NFR BLD AUTO: 2.5 % (ref 0.3–6.2)
ERYTHROCYTE [DISTWIDTH] IN BLOOD BY AUTOMATED COUNT: 13.2 % (ref 12.3–15.4)
GLOBULIN SER CALC-MCNC: 1.8 GM/DL
GLUCOSE SERPL-MCNC: 138 MG/DL (ref 65–99)
HBA1C MFR BLD: 5.7 % (ref 4.8–5.6)
HCT VFR BLD AUTO: 40.3 % (ref 34–46.6)
HDLC SERPL-MCNC: 42 MG/DL (ref 40–60)
HGB BLD-MCNC: 13.7 G/DL (ref 12–15.9)
IMM GRANULOCYTES # BLD AUTO: 0.01 10*3/MM3 (ref 0–0.05)
IMM GRANULOCYTES NFR BLD AUTO: 0.2 % (ref 0–0.5)
LDLC SERPL CALC-MCNC: 96 MG/DL (ref 0–100)
LYMPHOCYTES # BLD AUTO: 1.1 10*3/MM3 (ref 0.7–3.1)
LYMPHOCYTES NFR BLD AUTO: 24.9 % (ref 19.6–45.3)
MCH RBC QN AUTO: 30.6 PG (ref 26.6–33)
MCHC RBC AUTO-ENTMCNC: 34 G/DL (ref 31.5–35.7)
MCV RBC AUTO: 90.2 FL (ref 79–97)
MONOCYTES # BLD AUTO: 0.42 10*3/MM3 (ref 0.1–0.9)
MONOCYTES NFR BLD AUTO: 9.5 % (ref 5–12)
NEUTROPHILS # BLD AUTO: 2.74 10*3/MM3 (ref 1.7–7)
NEUTROPHILS NFR BLD AUTO: 62.2 % (ref 42.7–76)
NRBC BLD AUTO-RTO: 0 /100 WBC (ref 0–0.2)
PLATELET # BLD AUTO: 237 10*3/MM3 (ref 140–450)
POTASSIUM SERPL-SCNC: 4.4 MMOL/L (ref 3.5–5.2)
PROT SERPL-MCNC: 6.2 G/DL (ref 6–8.5)
RBC # BLD AUTO: 4.47 10*6/MM3 (ref 3.77–5.28)
SODIUM SERPL-SCNC: 139 MMOL/L (ref 136–145)
TRIGL SERPL-MCNC: 233 MG/DL (ref 0–150)
TSH SERPL DL<=0.005 MIU/L-ACNC: 1.67 UIU/ML (ref 0.27–4.2)
VLDLC SERPL CALC-MCNC: 40 MG/DL (ref 5–40)
WBC # BLD AUTO: 4.41 10*3/MM3 (ref 3.4–10.8)

## 2020-10-19 ENCOUNTER — OFFICE VISIT (OUTPATIENT)
Dept: FAMILY MEDICINE CLINIC | Facility: CLINIC | Age: 60
End: 2020-10-19

## 2020-10-19 VITALS
WEIGHT: 226 LBS | HEIGHT: 66 IN | HEART RATE: 66 BPM | SYSTOLIC BLOOD PRESSURE: 122 MMHG | OXYGEN SATURATION: 99 % | DIASTOLIC BLOOD PRESSURE: 82 MMHG | BODY MASS INDEX: 36.32 KG/M2

## 2020-10-19 DIAGNOSIS — Z12.39 ENCOUNTER FOR SPECIAL SCREENING EXAMINATION FOR NEOPLASM OF BREAST: ICD-10-CM

## 2020-10-19 DIAGNOSIS — E78.2 MIXED HYPERLIPIDEMIA: ICD-10-CM

## 2020-10-19 DIAGNOSIS — E55.9 VITAMIN D DEFICIENCY: ICD-10-CM

## 2020-10-19 DIAGNOSIS — Z78.0 POSTMENOPAUSAL: ICD-10-CM

## 2020-10-19 DIAGNOSIS — E03.9 ACQUIRED HYPOTHYROIDISM: ICD-10-CM

## 2020-10-19 DIAGNOSIS — E11.9 CONTROLLED TYPE 2 DIABETES MELLITUS WITHOUT COMPLICATION, WITHOUT LONG-TERM CURRENT USE OF INSULIN (HCC): ICD-10-CM

## 2020-10-19 DIAGNOSIS — E66.01 MORBID EXOGENOUS OBESITY (HCC): ICD-10-CM

## 2020-10-19 DIAGNOSIS — I10 ESSENTIAL HYPERTENSION: Primary | ICD-10-CM

## 2020-10-19 PROCEDURE — 99213 OFFICE O/P EST LOW 20 MIN: CPT | Performed by: FAMILY MEDICINE

## 2020-10-19 RX ORDER — SITAGLIPTIN AND METFORMIN HYDROCHLORIDE 1000; 50 MG/1; MG/1
TABLET, FILM COATED, EXTENDED RELEASE ORAL
Qty: 180 TABLET | Refills: 1 | Status: SHIPPED | OUTPATIENT
Start: 2020-10-19 | End: 2021-05-04 | Stop reason: ALTCHOICE

## 2020-10-19 NOTE — PROGRESS NOTES
Subjective   Marlena Waite is a 60 y.o. female with   Chief Complaint   Patient presents with   • Hypothyroidism   .    History of Present Illness   60-year-old white female with multiple medical problems here for further medical management.  Patient with type II non-insulin-dependent diabetes mellitus using Janumet XR at 50/thousand using 2 daily.  Patient also with hypothyroidism using levothyroxine at 75 mcg daily.  She continues to use ferrous sulfate as well as over-the-counter vitamin D and calcium.  She uses atenolol 50 mg daily for essential hypertension.  All medications are used on a regular basis and are well-tolerated without side effects.  Patient has lost 11 pounds since March of this year.  She is doing this intentionally.  Fasting labs have been acquired prior to this visit.  The following portions of the patient's history were reviewed and updated as appropriate: allergies, current medications, past family history, past medical history, past social history, past surgical history and problem list.    Review of Systems   Cardiovascular:        Hypertension   Endocrine:        Type II non-insulin-dependent diabetes mellitus, hypothyroidism       Objective     Vitals:    10/19/20 0758   BP: 122/82   Pulse: 66   SpO2: 99%       Recent Results (from the past 672 hour(s))   CBC w AUTO Differential    Collection Time: 10/08/20  8:52 AM    Specimen: Blood   Result Value Ref Range    WBC 4.41 3.40 - 10.80 10*3/mm3    RBC 4.47 3.77 - 5.28 10*6/mm3    Hemoglobin 13.7 12.0 - 15.9 g/dL    Hematocrit 40.3 34.0 - 46.6 %    MCV 90.2 79.0 - 97.0 fL    MCH 30.6 26.6 - 33.0 pg    MCHC 34.0 31.5 - 35.7 g/dL    RDW 13.2 12.3 - 15.4 %    Platelets 237 140 - 450 10*3/mm3    Neutrophil Rel % 62.2 42.7 - 76.0 %    Lymphocyte Rel % 24.9 19.6 - 45.3 %    Monocyte Rel % 9.5 5.0 - 12.0 %    Eosinophil Rel % 2.5 0.3 - 6.2 %    Basophil Rel % 0.7 0.0 - 1.5 %    Neutrophils Absolute 2.74 1.70 - 7.00 10*3/mm3    Lymphocytes  Absolute 1.10 0.70 - 3.10 10*3/mm3    Monocytes Absolute 0.42 0.10 - 0.90 10*3/mm3    Eosinophils Absolute 0.11 0.00 - 0.40 10*3/mm3    Basophils Absolute 0.03 0.00 - 0.20 10*3/mm3    Immature Granulocyte Rel % 0.2 0.0 - 0.5 %    Immature Grans Absolute 0.01 0.00 - 0.05 10*3/mm3    nRBC 0.0 0.0 - 0.2 /100 WBC   TSH    Collection Time: 10/08/20  8:52 AM    Specimen: Blood   Result Value Ref Range    TSH 1.670 0.270 - 4.200 uIU/mL   Vitamin D 25 hydroxy    Collection Time: 10/08/20  8:52 AM    Specimen: Blood   Result Value Ref Range    25 Hydroxy, Vitamin D 52.7 30.0 - 100.0 ng/ml   Comprehensive metabolic panel    Collection Time: 10/08/20  8:52 AM    Specimen: Blood   Result Value Ref Range    Glucose 138 (H) 65 - 99 mg/dL    BUN 15 8 - 23 mg/dL    Creatinine 0.88 0.57 - 1.00 mg/dL    eGFR Non African Am 65 >60 mL/min/1.73    eGFR African Am 79 >60 mL/min/1.73    BUN/Creatinine Ratio 17.0 7.0 - 25.0    Sodium 139 136 - 145 mmol/L    Potassium 4.4 3.5 - 5.2 mmol/L    Chloride 105 98 - 107 mmol/L    Total CO2 27.9 22.0 - 29.0 mmol/L    Calcium 9.9 8.6 - 10.5 mg/dL    Total Protein 6.2 6.0 - 8.5 g/dL    Albumin 4.40 3.50 - 5.20 g/dL    Globulin 1.8 gm/dL    A/G Ratio 2.4 g/dL    Total Bilirubin 0.6 0.0 - 1.2 mg/dL    Alkaline Phosphatase 53 39 - 117 U/L    AST (SGOT) 17 1 - 32 U/L    ALT (SGPT) 34 (H) 1 - 33 U/L   Hemoglobin A1c    Collection Time: 10/08/20  8:52 AM    Specimen: Blood   Result Value Ref Range    Hemoglobin A1C 5.70 (H) 4.80 - 5.60 %   Lipid panel    Collection Time: 10/08/20  8:52 AM    Specimen: Blood   Result Value Ref Range    Total Cholesterol 178 0 - 200 mg/dL    Triglycerides 233 (H) 0 - 150 mg/dL    HDL Cholesterol 42 40 - 60 mg/dL    VLDL Cholesterol Balta 40 5 - 40 mg/dL    LDL Chol Calc (NIH) 96 0 - 100 mg/dL       Physical Exam  Vitals signs and nursing note reviewed.   Constitutional:       Appearance: She is well-developed.      Comments: Exogenous obesity with a BMI of 37-11 pound weight  loss since March of this year.   HENT:      Head: Normocephalic and atraumatic.   Neck:      Musculoskeletal: Neck supple.      Thyroid: No thyroid mass or thyromegaly.      Vascular: Normal carotid pulses. No carotid bruit.      Trachea: Trachea and phonation normal.   Cardiovascular:      Rate and Rhythm: Normal rate and regular rhythm.      Heart sounds: Normal heart sounds. No murmur. No friction rub. No gallop.    Pulmonary:      Effort: Pulmonary effort is normal. No respiratory distress.      Breath sounds: Normal breath sounds. No decreased breath sounds, wheezing, rhonchi or rales.   Lymphadenopathy:      Cervical: No cervical adenopathy.   Skin:     General: Skin is warm and dry.      Findings: No rash.   Neurological:      Mental Status: She is alert and oriented to person, place, and time.   Psychiatric:         Speech: Speech normal.         Behavior: Behavior normal.         Thought Content: Thought content normal.         Judgment: Judgment normal.         Assessment/Plan   Diagnoses and all orders for this visit:    1. Essential hypertension (Primary)  -     CBC w AUTO Differential; Future  -     Lipid panel; Future  -     Hemoglobin A1c; Future  -     Comprehensive metabolic panel; Future  -     Vitamin D 25 hydroxy; Future  -     TSH; Future    2. Mixed hyperlipidemia  -     CBC w AUTO Differential; Future  -     Lipid panel; Future  -     Hemoglobin A1c; Future  -     Comprehensive metabolic panel; Future  -     Vitamin D 25 hydroxy; Future  -     TSH; Future    3. Vitamin D deficiency  -     CBC w AUTO Differential; Future  -     Lipid panel; Future  -     Hemoglobin A1c; Future  -     Comprehensive metabolic panel; Future  -     Vitamin D 25 hydroxy; Future  -     TSH; Future    4. Acquired hypothyroidism  -     CBC w AUTO Differential; Future  -     Lipid panel; Future  -     Hemoglobin A1c; Future  -     Comprehensive metabolic panel; Future  -     Vitamin D 25 hydroxy; Future  -     TSH;  Future    5. Controlled type 2 diabetes mellitus without complication, without long-term current use of insulin (CMS/HCC)  -     SITagliptin-metFORMIN HCl ER (Janumet XR)  MG tablet; TAKE 2 TABLETS BY MOUTH IN THE MORNING  Dispense: 180 tablet; Refill: 1  -     CBC w AUTO Differential; Future  -     Lipid panel; Future  -     Hemoglobin A1c; Future  -     Comprehensive metabolic panel; Future  -     Vitamin D 25 hydroxy; Future  -     TSH; Future    6. Encounter for special screening examination for neoplasm of breast  -     Mammo Screening Bilateral With CAD; Future  -     CBC w AUTO Differential; Future  -     Lipid panel; Future  -     Hemoglobin A1c; Future  -     Comprehensive metabolic panel; Future  -     Vitamin D 25 hydroxy; Future  -     TSH; Future    7. Postmenopausal  -     DEXA Bone Density Axial; Future  -     CBC w AUTO Differential; Future  -     Lipid panel; Future  -     Hemoglobin A1c; Future  -     Comprehensive metabolic panel; Future  -     Vitamin D 25 hydroxy; Future  -     TSH; Future    8. Morbid exogenous obesity (CMS/HCC)  -     CBC w AUTO Differential; Future  -     Lipid panel; Future  -     Hemoglobin A1c; Future  -     Comprehensive metabolic panel; Future  -     Vitamin D 25 hydroxy; Future  -     TSH; Future    Patient has been applauded with her efforts at weight loss which has stabilized her sugar status as well as blood pressure.  Further weight loss would be beneficial with potential to reduce sugar medication.  Levothyroxine will be continued without change.  Anticipate follow-up in 6 months preceded by fasting labs.    Return in about 6 months (around 4/19/2021) for Recheck.         Answers for HPI/ROS submitted by the patient on 10/17/2020   What is the primary reason for your visit?: Other  Please describe your symptoms.: Follow up from blood work.  Have you had these symptoms before?: No  How long have you been having these symptoms?: 1-4 days  Please list any  medications you are currently taking for this condition.: Atenolol, Janumet, Thyroid med  Please describe any probable cause for these symptoms. : N/A

## 2020-11-28 DIAGNOSIS — E03.9 ACQUIRED HYPOTHYROIDISM: ICD-10-CM

## 2020-11-30 RX ORDER — LEVOTHYROXINE SODIUM 0.07 MG/1
TABLET ORAL
Qty: 90 TABLET | Refills: 0 | Status: SHIPPED | OUTPATIENT
Start: 2020-11-30 | End: 2021-03-05

## 2021-01-06 DIAGNOSIS — I10 ESSENTIAL HYPERTENSION: ICD-10-CM

## 2021-01-06 RX ORDER — ATENOLOL 50 MG/1
TABLET ORAL
Qty: 90 TABLET | Refills: 0 | Status: SHIPPED | OUTPATIENT
Start: 2021-01-06 | End: 2021-04-12 | Stop reason: SDUPTHER

## 2021-03-05 DIAGNOSIS — E03.9 ACQUIRED HYPOTHYROIDISM: ICD-10-CM

## 2021-03-05 RX ORDER — LEVOTHYROXINE SODIUM 75 UG/1
TABLET ORAL
Qty: 90 TABLET | Refills: 0 | Status: SHIPPED | OUTPATIENT
Start: 2021-03-05 | End: 2021-06-02

## 2021-03-24 DIAGNOSIS — Z78.0 POSTMENOPAUSAL: ICD-10-CM

## 2021-03-24 DIAGNOSIS — E66.01 MORBID EXOGENOUS OBESITY (HCC): ICD-10-CM

## 2021-03-24 DIAGNOSIS — E78.2 MIXED HYPERLIPIDEMIA: ICD-10-CM

## 2021-03-24 DIAGNOSIS — E55.9 VITAMIN D DEFICIENCY: ICD-10-CM

## 2021-03-24 DIAGNOSIS — Z12.39 ENCOUNTER FOR SPECIAL SCREENING EXAMINATION FOR NEOPLASM OF BREAST: ICD-10-CM

## 2021-03-24 DIAGNOSIS — E11.9 CONTROLLED TYPE 2 DIABETES MELLITUS WITHOUT COMPLICATION, WITHOUT LONG-TERM CURRENT USE OF INSULIN (HCC): ICD-10-CM

## 2021-03-24 DIAGNOSIS — I10 ESSENTIAL HYPERTENSION: ICD-10-CM

## 2021-03-24 DIAGNOSIS — E03.9 ACQUIRED HYPOTHYROIDISM: ICD-10-CM

## 2021-04-07 LAB
25(OH)D3+25(OH)D2 SERPL-MCNC: 52.9 NG/ML
ALBUMIN SERPL-MCNC: 4.4 G/DL (ref 3.5–5.2)
ALBUMIN/GLOB SERPL: 1.8 G/DL
ALP SERPL-CCNC: 54 U/L (ref 39–117)
ALT SERPL-CCNC: 32 U/L (ref 1–33)
AST SERPL-CCNC: 18 U/L (ref 1–32)
BASOPHILS # BLD AUTO: 0.05 10*3/MM3 (ref 0–0.2)
BASOPHILS NFR BLD AUTO: 1.1 % (ref 0–1.5)
BILIRUB SERPL-MCNC: 0.6 MG/DL (ref 0–1.2)
BUN SERPL-MCNC: 16 MG/DL (ref 8–23)
BUN/CREAT SERPL: 18 (ref 7–25)
CALCIUM SERPL-MCNC: 10.1 MG/DL (ref 8.6–10.5)
CHLORIDE SERPL-SCNC: 105 MMOL/L (ref 98–107)
CHOLEST SERPL-MCNC: 201 MG/DL (ref 0–200)
CO2 SERPL-SCNC: 29.2 MMOL/L (ref 22–29)
CREAT SERPL-MCNC: 0.89 MG/DL (ref 0.57–1)
EOSINOPHIL # BLD AUTO: 0.13 10*3/MM3 (ref 0–0.4)
EOSINOPHIL NFR BLD AUTO: 2.9 % (ref 0.3–6.2)
ERYTHROCYTE [DISTWIDTH] IN BLOOD BY AUTOMATED COUNT: 13.2 % (ref 12.3–15.4)
GLOBULIN SER CALC-MCNC: 2.4 GM/DL
GLUCOSE SERPL-MCNC: 132 MG/DL (ref 65–99)
HBA1C MFR BLD: 5 % (ref 4.8–5.6)
HCT VFR BLD AUTO: 43.8 % (ref 34–46.6)
HDLC SERPL-MCNC: 44 MG/DL (ref 40–60)
HGB BLD-MCNC: 14.7 G/DL (ref 12–15.9)
IMM GRANULOCYTES # BLD AUTO: 0.01 10*3/MM3 (ref 0–0.05)
IMM GRANULOCYTES NFR BLD AUTO: 0.2 % (ref 0–0.5)
LDLC SERPL CALC-MCNC: 119 MG/DL (ref 0–100)
LYMPHOCYTES # BLD AUTO: 1 10*3/MM3 (ref 0.7–3.1)
LYMPHOCYTES NFR BLD AUTO: 22 % (ref 19.6–45.3)
MCH RBC QN AUTO: 30.9 PG (ref 26.6–33)
MCHC RBC AUTO-ENTMCNC: 33.6 G/DL (ref 31.5–35.7)
MCV RBC AUTO: 92.2 FL (ref 79–97)
MONOCYTES # BLD AUTO: 0.42 10*3/MM3 (ref 0.1–0.9)
MONOCYTES NFR BLD AUTO: 9.3 % (ref 5–12)
NEUTROPHILS # BLD AUTO: 2.93 10*3/MM3 (ref 1.7–7)
NEUTROPHILS NFR BLD AUTO: 64.5 % (ref 42.7–76)
NRBC BLD AUTO-RTO: 0 /100 WBC (ref 0–0.2)
PLATELET # BLD AUTO: 269 10*3/MM3 (ref 140–450)
POTASSIUM SERPL-SCNC: 4.7 MMOL/L (ref 3.5–5.2)
PROT SERPL-MCNC: 6.8 G/DL (ref 6–8.5)
RBC # BLD AUTO: 4.75 10*6/MM3 (ref 3.77–5.28)
SODIUM SERPL-SCNC: 142 MMOL/L (ref 136–145)
TRIGL SERPL-MCNC: 218 MG/DL (ref 0–150)
TSH SERPL DL<=0.005 MIU/L-ACNC: 1.62 UIU/ML (ref 0.27–4.2)
VLDLC SERPL CALC-MCNC: 38 MG/DL (ref 5–40)
WBC # BLD AUTO: 4.54 10*3/MM3 (ref 3.4–10.8)

## 2021-04-12 ENCOUNTER — OFFICE VISIT (OUTPATIENT)
Dept: FAMILY MEDICINE CLINIC | Facility: CLINIC | Age: 61
End: 2021-04-12

## 2021-04-12 VITALS
TEMPERATURE: 96.9 F | WEIGHT: 231.3 LBS | HEIGHT: 66 IN | BODY MASS INDEX: 37.17 KG/M2 | DIASTOLIC BLOOD PRESSURE: 88 MMHG | SYSTOLIC BLOOD PRESSURE: 132 MMHG | OXYGEN SATURATION: 97 % | HEART RATE: 67 BPM

## 2021-04-12 DIAGNOSIS — E11.9 CONTROLLED TYPE 2 DIABETES MELLITUS WITHOUT COMPLICATION, WITHOUT LONG-TERM CURRENT USE OF INSULIN (HCC): ICD-10-CM

## 2021-04-12 DIAGNOSIS — E03.9 ACQUIRED HYPOTHYROIDISM: ICD-10-CM

## 2021-04-12 DIAGNOSIS — E78.2 MIXED HYPERLIPIDEMIA: ICD-10-CM

## 2021-04-12 DIAGNOSIS — E55.9 VITAMIN D DEFICIENCY: ICD-10-CM

## 2021-04-12 DIAGNOSIS — E66.01 MORBID EXOGENOUS OBESITY (HCC): ICD-10-CM

## 2021-04-12 DIAGNOSIS — I10 ESSENTIAL HYPERTENSION: Primary | ICD-10-CM

## 2021-04-12 PROCEDURE — 99213 OFFICE O/P EST LOW 20 MIN: CPT | Performed by: FAMILY MEDICINE

## 2021-04-12 RX ORDER — ROSUVASTATIN CALCIUM 5 MG/1
5 TABLET, COATED ORAL DAILY
Qty: 90 TABLET | Refills: 1 | Status: SHIPPED | OUTPATIENT
Start: 2021-04-12 | End: 2021-10-06 | Stop reason: SDUPTHER

## 2021-04-12 RX ORDER — ATENOLOL 50 MG/1
50 TABLET ORAL DAILY
Qty: 90 TABLET | Refills: 1 | Status: SHIPPED | OUTPATIENT
Start: 2021-04-12 | End: 2021-10-06 | Stop reason: SDUPTHER

## 2021-04-12 NOTE — PROGRESS NOTES
Subjective   Marlena Waite is a 61 y.o. female with   Chief Complaint   Patient presents with   • Hypothyroidism   .    History of Present Illness   61-year-old white female with multiple medical problems here for further medical management.  Patient with known history of essential hypertension and hyperlipidemia as well as type II non-insulin-dependent diabetes mellitus.  Current medications include atenolol 50 mg daily as well as Euthyrox at 75 mcg daily for hypothyroidism.  She does use over-the-counter vitamin D3 as well as iron sulfate.  She is using Janumet XR at 50/thousand using 2 daily.  She does state that the coupon that she was using has just run out with her last refill which she has just exercise.  Upon next refill it will be full price.  Overall she is tolerating this regimen well and there have been no side effects.  She does work at [employer] where she is on her feet moving all day but has no formal exercise program.  She and her  are type II diabetics and they are trying to watch what they consume as far as a diet.  The following portions of the patient's history were reviewed and updated as appropriate: allergies, current medications, past family history, past medical history, past social history, past surgical history and problem list.    Review of Systems   Constitutional:        Morbid exogenous obesity   Cardiovascular:        Hypertension, hyperlipidemia   Endocrine:        Type II non-insulin-dependent diabetes mellitus       Objective     Vitals:    04/12/21 0754   BP: 132/88   Pulse: 67   Temp: 96.9 °F (36.1 °C)   SpO2: 97%       Recent Results (from the past 672 hour(s))   TSH    Collection Time: 04/06/21  8:34 AM    Specimen: Blood   Result Value Ref Range    TSH 1.620 0.270 - 4.200 uIU/mL   Vitamin D 25 hydroxy    Collection Time: 04/06/21  8:34 AM    Specimen: Blood   Result Value Ref Range    25 Hydroxy, Vitamin D 52.9 ng/ml   Comprehensive metabolic panel    Collection Time:  04/06/21  8:34 AM    Specimen: Blood   Result Value Ref Range    Glucose 132 (H) 65 - 99 mg/dL    BUN 16 8 - 23 mg/dL    Creatinine 0.89 0.57 - 1.00 mg/dL    eGFR Non African Am 64 >60 mL/min/1.73    eGFR African Am 78 >60 mL/min/1.73    BUN/Creatinine Ratio 18.0 7.0 - 25.0    Sodium 142 136 - 145 mmol/L    Potassium 4.7 3.5 - 5.2 mmol/L    Chloride 105 98 - 107 mmol/L    Total CO2 29.2 (H) 22.0 - 29.0 mmol/L    Calcium 10.1 8.6 - 10.5 mg/dL    Total Protein 6.8 6.0 - 8.5 g/dL    Albumin 4.40 3.50 - 5.20 g/dL    Globulin 2.4 gm/dL    A/G Ratio 1.8 g/dL    Total Bilirubin 0.6 0.0 - 1.2 mg/dL    Alkaline Phosphatase 54 39 - 117 U/L    AST (SGOT) 18 1 - 32 U/L    ALT (SGPT) 32 1 - 33 U/L   Hemoglobin A1c    Collection Time: 04/06/21  8:34 AM    Specimen: Blood   Result Value Ref Range    Hemoglobin A1C 5.00 4.80 - 5.60 %   Lipid panel    Collection Time: 04/06/21  8:34 AM    Specimen: Blood   Result Value Ref Range    Total Cholesterol 201 (H) 0 - 200 mg/dL    Triglycerides 218 (H) 0 - 150 mg/dL    HDL Cholesterol 44 40 - 60 mg/dL    VLDL Cholesterol Balta 38 5 - 40 mg/dL    LDL Chol Calc (NIH) 119 (H) 0 - 100 mg/dL   CBC w AUTO Differential    Collection Time: 04/06/21  8:34 AM    Specimen: Blood   Result Value Ref Range    WBC 4.54 3.40 - 10.80 10*3/mm3    RBC 4.75 3.77 - 5.28 10*6/mm3    Hemoglobin 14.7 12.0 - 15.9 g/dL    Hematocrit 43.8 34.0 - 46.6 %    MCV 92.2 79.0 - 97.0 fL    MCH 30.9 26.6 - 33.0 pg    MCHC 33.6 31.5 - 35.7 g/dL    RDW 13.2 12.3 - 15.4 %    Platelets 269 140 - 450 10*3/mm3    Neutrophil Rel % 64.5 42.7 - 76.0 %    Lymphocyte Rel % 22.0 19.6 - 45.3 %    Monocyte Rel % 9.3 5.0 - 12.0 %    Eosinophil Rel % 2.9 0.3 - 6.2 %    Basophil Rel % 1.1 0.0 - 1.5 %    Neutrophils Absolute 2.93 1.70 - 7.00 10*3/mm3    Lymphocytes Absolute 1.00 0.70 - 3.10 10*3/mm3    Monocytes Absolute 0.42 0.10 - 0.90 10*3/mm3    Eosinophils Absolute 0.13 0.00 - 0.40 10*3/mm3    Basophils Absolute 0.05 0.00 - 0.20 10*3/mm3     Immature Granulocyte Rel % 0.2 0.0 - 0.5 %    Immature Grans Absolute 0.01 0.00 - 0.05 10*3/mm3    nRBC 0.0 0.0 - 0.2 /100 WBC       Physical Exam  Vitals and nursing note reviewed.   Constitutional:       Appearance: Normal appearance. She is well-developed and well-groomed. She is morbidly obese.      Comments: Morbid exogenous obesity with a BMI of 37.9   HENT:      Head: Normocephalic and atraumatic.   Neck:      Thyroid: No thyroid mass or thyromegaly.      Vascular: Normal carotid pulses. No carotid bruit.      Trachea: Trachea and phonation normal.   Cardiovascular:      Rate and Rhythm: Normal rate and regular rhythm.      Heart sounds: Normal heart sounds. No murmur heard.   No friction rub. No gallop.    Pulmonary:      Effort: Pulmonary effort is normal. No respiratory distress.      Breath sounds: Normal breath sounds. No decreased breath sounds, wheezing, rhonchi or rales.   Musculoskeletal:      Cervical back: Neck supple.   Lymphadenopathy:      Cervical: No cervical adenopathy.   Skin:     General: Skin is warm and dry.      Findings: No rash.   Neurological:      Mental Status: She is alert and oriented to person, place, and time.   Psychiatric:         Attention and Perception: Attention and perception normal.         Mood and Affect: Mood and affect normal.         Speech: Speech normal.         Behavior: Behavior normal. Behavior is cooperative.         Thought Content: Thought content normal.         Cognition and Memory: Cognition and memory normal.         Judgment: Judgment normal.         Assessment/Plan   Diagnoses and all orders for this visit:    1. Essential hypertension (Primary)  -     atenolol (TENORMIN) 50 MG tablet; Take 1 tablet by mouth Daily.  Dispense: 90 tablet; Refill: 1  -     CBC w AUTO Differential; Future  -     Lipid panel; Future  -     Hemoglobin A1c; Future  -     Comprehensive metabolic panel; Future  -     Vitamin D 25 hydroxy; Future  -     TSH; Future    2.  Mixed hyperlipidemia  -     rosuvastatin (Crestor) 5 MG tablet; Take 1 tablet by mouth Daily.  Dispense: 90 tablet; Refill: 1  -     CBC w AUTO Differential; Future  -     Lipid panel; Future  -     Hemoglobin A1c; Future  -     Comprehensive metabolic panel; Future  -     Vitamin D 25 hydroxy; Future  -     TSH; Future    3. Acquired hypothyroidism  -     CBC w AUTO Differential; Future  -     Lipid panel; Future  -     Hemoglobin A1c; Future  -     Comprehensive metabolic panel; Future  -     Vitamin D 25 hydroxy; Future  -     TSH; Future    4. Controlled type 2 diabetes mellitus without complication, without long-term current use of insulin (CMS/[facility])  -     CBC w AUTO Differential; Future  -     Lipid panel; Future  -     Hemoglobin A1c; Future  -     Comprehensive metabolic panel; Future  -     Vitamin D 25 hydroxy; Future  -     TSH; Future    5. Morbid exogenous obesity (CMS/HCC)  -     CBC w AUTO Differential; Future  -     Lipid panel; Future  -     Hemoglobin A1c; Future  -     Comprehensive metabolic panel; Future  -     Vitamin D 25 hydroxy; Future  -     TSH; Future    6. Vitamin D deficiency  -     CBC w AUTO Differential; Future  -     Lipid panel; Future  -     Hemoglobin A1c; Future  -     Comprehensive metabolic panel; Future  -     Vitamin D 25 hydroxy; Future  -     TSH; Future    Given the fact that her A1c is limited to 5.0 she will contact this office upon running out of this last refill of Janumet XR and we will change this to Metformin 1000 mg twice daily.  Anticipate next fasting lab work in 6 months with follow-up with me thereafter.  Crestor 5 mg will be initiated to lower LDL hopefully to a goal of 70 or less.    Return in about 6 months (around 10/12/2021).

## 2021-05-04 DIAGNOSIS — E11.9 CONTROLLED TYPE 2 DIABETES MELLITUS WITHOUT COMPLICATION, WITHOUT LONG-TERM CURRENT USE OF INSULIN (HCC): Primary | ICD-10-CM

## 2021-05-04 RX ORDER — METFORMIN HYDROCHLORIDE 750 MG/1
TABLET, EXTENDED RELEASE ORAL
Qty: 180 TABLET | Refills: 1 | Status: SHIPPED | OUTPATIENT
Start: 2021-05-04 | End: 2021-11-05

## 2021-05-12 ENCOUNTER — OFFICE VISIT (OUTPATIENT)
Dept: FAMILY MEDICINE CLINIC | Facility: CLINIC | Age: 61
End: 2021-05-12

## 2021-05-12 VITALS
TEMPERATURE: 97.8 F | SYSTOLIC BLOOD PRESSURE: 130 MMHG | BODY MASS INDEX: 36.48 KG/M2 | WEIGHT: 227 LBS | OXYGEN SATURATION: 98 % | HEART RATE: 57 BPM | DIASTOLIC BLOOD PRESSURE: 80 MMHG | HEIGHT: 66 IN

## 2021-05-12 DIAGNOSIS — B96.89 BACTERIAL CONJUNCTIVITIS OF BOTH EYES: Primary | ICD-10-CM

## 2021-05-12 DIAGNOSIS — H10.9 BACTERIAL CONJUNCTIVITIS OF BOTH EYES: Primary | ICD-10-CM

## 2021-05-12 PROCEDURE — 99213 OFFICE O/P EST LOW 20 MIN: CPT | Performed by: PHYSICIAN ASSISTANT

## 2021-05-12 RX ORDER — CIPROFLOXACIN HYDROCHLORIDE 3.5 MG/ML
SOLUTION/ DROPS TOPICAL
Qty: 10 ML | Refills: 0 | Status: SHIPPED | OUTPATIENT
Start: 2021-05-12 | End: 2021-10-06

## 2021-05-12 NOTE — PROGRESS NOTES
"Chief Complaint  eye irritation (left eye  )    Subjective          Marlena Waite presents to [facility]  History of Present Illness  Marlena is a 61 year old female who presents with left eye irration for the past day.  She has had some discharge, and feels thickness in corner of eye.  Denied any rashes,changed in eye makeup,recent injury to eye or URI symptoms.  Appetite and sleep has been normal. She works at [employer]. Denied any use of OTC medications.        Objective   Vital Signs:   /80   Pulse 57   Temp 97.8 °F (36.6 °C)   Ht 166.4 cm (65.51\")   Wt 103 kg (227 lb)   SpO2 98%   BMI 37.19 kg/m²     Physical Exam  Vitals and nursing note reviewed.   Constitutional:       Appearance: Normal appearance. She is well-developed and well-groomed. She is obese.      Interventions: Face mask in place.   HENT:      Head: Normocephalic and atraumatic.      Right Ear: Hearing, tympanic membrane, ear canal and external ear normal.      Left Ear: Hearing, tympanic membrane, ear canal and external ear normal.   Eyes:      General: Lids are normal.         Left eye: Discharge (watery) present.     Conjunctiva/sclera:      Right eye: Right conjunctiva is injected.      Left eye: Left conjunctiva is injected.      Pupils: Pupils are equal, round, and reactive to light.   Neck:      Trachea: Trachea and phonation normal. No tracheal tenderness.   Cardiovascular:      Rate and Rhythm: Normal rate and regular rhythm.      Pulses: Normal pulses.      Heart sounds: Normal heart sounds, S1 normal and S2 normal. No murmur heard.     Pulmonary:      Effort: Pulmonary effort is normal.      Breath sounds: Normal breath sounds and air entry.   Abdominal:      General: Bowel sounds are normal.      Palpations: Abdomen is soft. There is no hepatomegaly.      Tenderness: There is no abdominal tenderness.   Musculoskeletal:      Cervical back: Neck supple.      Right lower leg: No edema.      " Left lower leg: No edema.   Skin:     General: Skin is warm and dry.      Capillary Refill: Capillary refill takes less than 2 seconds.   Neurological:      Mental Status: She is alert and oriented to person, place, and time.   Psychiatric:         Attention and Perception: Attention and perception normal.         Mood and Affect: Mood and affect normal.         Speech: Speech normal.         Behavior: Behavior normal. Behavior is cooperative.         Thought Content: Thought content normal.         Cognition and Memory: Cognition and memory normal.         Judgment: Judgment normal.     I was wearing a surgical mask and face shield during the entire office visit/encounter.     Result Review :                 Assessment and Plan    Diagnoses and all orders for this visit:    1. Bacterial conjunctivitis of both eyes (Primary)  -     ciprofloxacin (Ciloxan) 0.3 % ophthalmic solution; Place 2 drops in each eye every 2 hours while awake for 2 days then every 4 hours while awake for 5 days  Dispense: 10 mL; Refill: 0    Mrs.Vanessa Waite was seen in office today with new bilateral conjunctivitis.  I have sent ciprofloxacin ophthalmic solution to pharmacy to use as directed.  Stressed importance and using good handwashing.  I have given her a work note for the next 1-2 days.  She will return to office if symptoms do not improve.      Follow Up   No follow-ups on file.  Patient was given instructions and counseling regarding her condition or for health maintenance advice. Please see specific information pulled into the AVS if appropriate.     [facility]  [address] [place] [id number]  Dept: [phone]  Dept Fax: [phone]  Loc: [phone]  Loc Fax: [phone]

## 2021-05-31 DIAGNOSIS — E03.9 ACQUIRED HYPOTHYROIDISM: ICD-10-CM

## 2021-06-02 RX ORDER — LEVOTHYROXINE SODIUM 75 UG/1
TABLET ORAL
Qty: 90 TABLET | Refills: 1 | Status: SHIPPED | OUTPATIENT
Start: 2021-06-02 | End: 2021-11-29

## 2021-10-06 ENCOUNTER — OFFICE VISIT (OUTPATIENT)
Dept: FAMILY MEDICINE CLINIC | Facility: CLINIC | Age: 61
End: 2021-10-06

## 2021-10-06 VITALS
SYSTOLIC BLOOD PRESSURE: 124 MMHG | DIASTOLIC BLOOD PRESSURE: 82 MMHG | BODY MASS INDEX: 36 KG/M2 | HEART RATE: 74 BPM | OXYGEN SATURATION: 98 % | TEMPERATURE: 97.3 F | HEIGHT: 66 IN | WEIGHT: 224 LBS

## 2021-10-06 DIAGNOSIS — E03.9 ACQUIRED HYPOTHYROIDISM: ICD-10-CM

## 2021-10-06 DIAGNOSIS — I10 ESSENTIAL HYPERTENSION: Primary | ICD-10-CM

## 2021-10-06 DIAGNOSIS — E55.9 VITAMIN D DEFICIENCY: ICD-10-CM

## 2021-10-06 DIAGNOSIS — E66.01 MORBID EXOGENOUS OBESITY (HCC): ICD-10-CM

## 2021-10-06 DIAGNOSIS — E11.9 CONTROLLED TYPE 2 DIABETES MELLITUS WITHOUT COMPLICATION, WITHOUT LONG-TERM CURRENT USE OF INSULIN (HCC): ICD-10-CM

## 2021-10-06 DIAGNOSIS — Z78.0 POSTMENOPAUSAL: ICD-10-CM

## 2021-10-06 DIAGNOSIS — Z12.31 ENCOUNTER FOR SCREENING MAMMOGRAM FOR MALIGNANT NEOPLASM OF BREAST: ICD-10-CM

## 2021-10-06 DIAGNOSIS — E78.2 MIXED HYPERLIPIDEMIA: ICD-10-CM

## 2021-10-06 PROCEDURE — 99214 OFFICE O/P EST MOD 30 MIN: CPT | Performed by: FAMILY MEDICINE

## 2021-10-06 RX ORDER — ATENOLOL 50 MG/1
50 TABLET ORAL DAILY
Qty: 90 TABLET | Refills: 1 | Status: SHIPPED | OUTPATIENT
Start: 2021-10-06 | End: 2022-04-04

## 2021-10-06 RX ORDER — ROSUVASTATIN CALCIUM 5 MG/1
5 TABLET, COATED ORAL DAILY
Qty: 90 TABLET | Refills: 1 | Status: SHIPPED | OUTPATIENT
Start: 2021-10-06 | End: 2022-04-04

## 2021-10-06 NOTE — PROGRESS NOTES
Subjective   Marlena Waite is a 61 y.o. female with   Chief Complaint   Patient presents with   • Hypertension   • Hypothyroidism   .    History of Present Illness   Is a 61-year-old white female with history of hypertension and hypothyroidism here for further medical management.  Patient also with history of hyperlipidemia.  Type II non-insulin-dependent diabetes mellitus.  Medications include atenolol at 50 mg daily as well as levothyroxine 75 mcg daily.  Patient does take ferrous sulfate 325 mg daily in addition to Metformin ER at 750 mg at two daily.  Rosuvastatin is used at 5 mg daily.  Does use a host of supplements and vitamins.  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  Is has her more active-rather than standing or sitting in front of a computer.  She has actually lost weight and has achieved at least to two separate races since making this job change.  Fasting labs have been acquired prior to this visit.  In general she is doing well without complaints.  The following portions of the patient's history were reviewed and updated as appropriate: allergies, current medications, past family history, past medical history, past social history, past surgical history and problem list.    Review of Systems   Constitutional:        Obesity   Cardiovascular:        Hypertension, hyperlipidemia   Endocrine:        Hypothyroidism, type II non-insulin-dependent diabetes mellitus       Objective     Vitals:    10/06/21 0759   BP: 124/82   Pulse: 74   Temp: 97.3 °F (36.3 °C)   SpO2: 98%       Recent Results (from the past 672 hour(s))   CBC w AUTO Differential    Collection Time: 09/29/21  8:03 AM    Specimen: Blood   Result Value Ref Range    WBC 5.53 3.40 - 10.80 10*3/mm3    RBC 4.40 3.77 - 5.28 10*6/mm3    Hemoglobin 13.2 12.0 - 15.9 g/dL    Hematocrit 40.3 34.0 - 46.6 %    MCV 91.6 79.0 - 97.0 fL    MCH 30.0 26.6 - 33.0 pg    MCHC 32.8 31.5 - 35.7 g/dL    RDW 13.4 12.3 -  15.4 %    Platelets 245 140 - 450 10*3/mm3    Neutrophil Rel % 60.6 42.7 - 76.0 %    Lymphocyte Rel % 28.4 19.6 - 45.3 %    Monocyte Rel % 8.5 5.0 - 12.0 %    Eosinophil Rel % 1.8 0.3 - 6.2 %    Basophil Rel % 0.5 0.0 - 1.5 %    Neutrophils Absolute 3.35 1.70 - 7.00 10*3/mm3    Lymphocytes Absolute 1.57 0.70 - 3.10 10*3/mm3    Monocytes Absolute 0.47 0.10 - 0.90 10*3/mm3    Eosinophils Absolute 0.10 0.00 - 0.40 10*3/mm3    Basophils Absolute 0.03 0.00 - 0.20 10*3/mm3    Immature Granulocyte Rel % 0.2 0.0 - 0.5 %    Immature Grans Absolute 0.01 0.00 - 0.05 10*3/mm3    nRBC 0.0 0.0 - 0.2 /100 WBC   Lipid panel    Collection Time: 09/29/21  8:03 AM    Specimen: Blood   Result Value Ref Range    Total Cholesterol 135 0 - 200 mg/dL    Triglycerides 158 (H) 0 - 150 mg/dL    HDL Cholesterol 46 40 - 60 mg/dL    VLDL Cholesterol Balta 27 5 - 40 mg/dL    LDL Chol Calc (NIH) 62 0 - 100 mg/dL   Hemoglobin A1c    Collection Time: 09/29/21  8:03 AM    Specimen: Blood   Result Value Ref Range    Hemoglobin A1C 6.10 (H) 4.80 - 5.60 %   Comprehensive metabolic panel    Collection Time: 09/29/21  8:03 AM    Specimen: Blood   Result Value Ref Range    Glucose 110 (H) 65 - 99 mg/dL    BUN 15 8 - 23 mg/dL    Creatinine 0.92 0.57 - 1.00 mg/dL    eGFR Non African Am 62 >60 mL/min/1.73    eGFR African Am 75 >60 mL/min/1.73    BUN/Creatinine Ratio 16.3 7.0 - 25.0    Sodium 139 136 - 145 mmol/L    Potassium 4.2 3.5 - 5.2 mmol/L    Chloride 101 98 - 107 mmol/L    Total CO2 27.0 22.0 - 29.0 mmol/L    Calcium 10.8 (H) 8.6 - 10.5 mg/dL    Total Protein 6.5 6.0 - 8.5 g/dL    Albumin 4.50 3.50 - 5.20 g/dL    Globulin 2.0 gm/dL    A/G Ratio 2.3 g/dL    Total Bilirubin 0.6 0.0 - 1.2 mg/dL    Alkaline Phosphatase 52 39 - 117 U/L    AST (SGOT) 21 1 - 32 U/L    ALT (SGPT) 34 (H) 1 - 33 U/L   Vitamin D 25 hydroxy    Collection Time: 09/29/21  8:03 AM    Specimen: Blood   Result Value Ref Range    25 Hydroxy, Vitamin D 53.5 30.0 - 100.0 ng/ml   TSH     Collection Time: 09/29/21  8:03 AM    Specimen: Blood   Result Value Ref Range    TSH 2.740 0.270 - 4.200 uIU/mL       Physical Exam  Vitals and nursing note reviewed.   Constitutional:       Appearance: Normal appearance. She is well-developed and well-groomed. She is obese.      Comments: Exogenous obesity with a BMI of 36.7   HENT:      Head: Normocephalic and atraumatic.   Neck:      Thyroid: No thyroid mass or thyromegaly.      Vascular: Normal carotid pulses. No carotid bruit.      Trachea: Trachea and phonation normal.   Cardiovascular:      Rate and Rhythm: Normal rate and regular rhythm.      Heart sounds: Normal heart sounds. No murmur heard.  No friction rub. No gallop.    Pulmonary:      Effort: Pulmonary effort is normal. No respiratory distress.      Breath sounds: Normal breath sounds. No decreased breath sounds, wheezing, rhonchi or rales.   Musculoskeletal:      Cervical back: Neck supple.   Lymphadenopathy:      Cervical: No cervical adenopathy.   Skin:     General: Skin is warm and dry.      Findings: No rash.   Neurological:      Mental Status: She is alert and oriented to person, place, and time.   Psychiatric:         Attention and Perception: Attention and perception normal.         Mood and Affect: Mood and affect normal.         Speech: Speech normal.         Behavior: Behavior normal. Behavior is cooperative.         Thought Content: Thought content normal.         Cognition and Memory: Cognition and memory normal.         Judgment: Judgment normal.         Assessment/Plan   Diagnoses and all orders for this visit:    1. Essential hypertension (Primary)  -     atenolol (TENORMIN) 50 MG tablet; Take 1 tablet by mouth Daily.  Dispense: 90 tablet; Refill: 1  -     Lipid panel; Future  -     Hemoglobin A1c; Future  -     Comprehensive metabolic panel; Future  -     Vitamin D 25 hydroxy; Future  -     TSH; Future  -     CBC w AUTO Differential; Future    2. Mixed hyperlipidemia  -      rosuvastatin (Crestor) 5 MG tablet; Take 1 tablet by mouth Daily.  Dispense: 90 tablet; Refill: 1  -     Lipid panel; Future  -     Hemoglobin A1c; Future  -     Comprehensive metabolic panel; Future  -     Vitamin D 25 hydroxy; Future  -     TSH; Future  -     CBC w AUTO Differential; Future    3. Controlled type 2 diabetes mellitus without complication, without long-term current use of insulin (HCC)  -     Lipid panel; Future  -     Hemoglobin A1c; Future  -     Comprehensive metabolic panel; Future  -     Vitamin D 25 hydroxy; Future  -     TSH; Future  -     CBC w AUTO Differential; Future    4. Acquired hypothyroidism  -     Lipid panel; Future  -     Hemoglobin A1c; Future  -     Comprehensive metabolic panel; Future  -     Vitamin D 25 hydroxy; Future  -     TSH; Future  -     CBC w AUTO Differential; Future    5. Morbid exogenous obesity (HCC)  -     Lipid panel; Future  -     Hemoglobin A1c; Future  -     Comprehensive metabolic panel; Future  -     Vitamin D 25 hydroxy; Future  -     TSH; Future  -     CBC w AUTO Differential; Future    6. Vitamin D deficiency  -     Lipid panel; Future  -     Hemoglobin A1c; Future  -     Comprehensive metabolic panel; Future  -     Vitamin D 25 hydroxy; Future  -     TSH; Future  -     CBC w AUTO Differential; Future    7. Encounter for screening mammogram for malignant neoplasm of breast  -     Mammo Screening Bilateral With CAD; Future  -     Lipid panel; Future  -     Hemoglobin A1c; Future  -     Comprehensive metabolic panel; Future  -     Vitamin D 25 hydroxy; Future  -     TSH; Future  -     CBC w AUTO Differential; Future    8. Postmenopausal  -     DEXA Bone Density Axial; Future  -     Lipid panel; Future  -     Hemoglobin A1c; Future  -     Comprehensive metabolic panel; Future  -     Vitamin D 25 hydroxy; Future  -     TSH; Future  -     CBC w AUTO Differential; Future        Return in about 6 months (around 4/6/2022) for Recheck.

## 2021-11-05 DIAGNOSIS — E11.9 CONTROLLED TYPE 2 DIABETES MELLITUS WITHOUT COMPLICATION, WITHOUT LONG-TERM CURRENT USE OF INSULIN (HCC): ICD-10-CM

## 2021-11-05 RX ORDER — METFORMIN HYDROCHLORIDE 750 MG/1
TABLET, EXTENDED RELEASE ORAL
Qty: 180 TABLET | Refills: 1 | Status: SHIPPED | OUTPATIENT
Start: 2021-11-05 | End: 2022-04-11 | Stop reason: SDUPTHER

## 2021-11-27 DIAGNOSIS — E03.9 ACQUIRED HYPOTHYROIDISM: ICD-10-CM

## 2021-11-29 RX ORDER — LEVOTHYROXINE SODIUM 75 UG/1
TABLET ORAL
Qty: 90 TABLET | Refills: 0 | Status: SHIPPED | OUTPATIENT
Start: 2021-11-29 | End: 2022-03-07

## 2022-03-06 DIAGNOSIS — E03.9 ACQUIRED HYPOTHYROIDISM: ICD-10-CM

## 2022-03-07 RX ORDER — LEVOTHYROXINE SODIUM 75 UG/1
TABLET ORAL
Qty: 90 TABLET | Refills: 0 | Status: SHIPPED | OUTPATIENT
Start: 2022-03-07 | End: 2022-06-13

## 2022-03-30 DIAGNOSIS — Z78.0 POSTMENOPAUSAL: ICD-10-CM

## 2022-03-30 DIAGNOSIS — E55.9 VITAMIN D DEFICIENCY: ICD-10-CM

## 2022-03-30 DIAGNOSIS — E03.9 ACQUIRED HYPOTHYROIDISM: ICD-10-CM

## 2022-03-30 DIAGNOSIS — E11.9 CONTROLLED TYPE 2 DIABETES MELLITUS WITHOUT COMPLICATION, WITHOUT LONG-TERM CURRENT USE OF INSULIN: ICD-10-CM

## 2022-03-30 DIAGNOSIS — I10 ESSENTIAL HYPERTENSION: ICD-10-CM

## 2022-03-30 DIAGNOSIS — E78.2 MIXED HYPERLIPIDEMIA: ICD-10-CM

## 2022-03-30 DIAGNOSIS — Z12.31 ENCOUNTER FOR SCREENING MAMMOGRAM FOR MALIGNANT NEOPLASM OF BREAST: ICD-10-CM

## 2022-03-30 DIAGNOSIS — E66.01 MORBID EXOGENOUS OBESITY: ICD-10-CM

## 2022-04-02 LAB
25(OH)D3+25(OH)D2 SERPL-MCNC: 56.5 NG/ML (ref 30–100)
ALBUMIN SERPL-MCNC: 4.4 G/DL (ref 3.8–4.8)
ALBUMIN/GLOB SERPL: 1.9 {RATIO} (ref 1.2–2.2)
ALP SERPL-CCNC: 57 IU/L (ref 44–121)
ALT SERPL-CCNC: 21 IU/L (ref 0–32)
AST SERPL-CCNC: 19 IU/L (ref 0–40)
BASOPHILS # BLD AUTO: 0 X10E3/UL (ref 0–0.2)
BASOPHILS NFR BLD AUTO: 1 %
BILIRUB SERPL-MCNC: 0.6 MG/DL (ref 0–1.2)
BUN SERPL-MCNC: 15 MG/DL (ref 8–27)
BUN/CREAT SERPL: 17 (ref 12–28)
CALCIUM SERPL-MCNC: 10.9 MG/DL (ref 8.7–10.3)
CHLORIDE SERPL-SCNC: 100 MMOL/L (ref 96–106)
CHOLEST SERPL-MCNC: 136 MG/DL (ref 100–199)
CO2 SERPL-SCNC: 25 MMOL/L (ref 20–29)
CREAT SERPL-MCNC: 0.86 MG/DL (ref 0.57–1)
EGFRCR SERPLBLD CKD-EPI 2021: 76 ML/MIN/1.73
EOSINOPHIL # BLD AUTO: 0.2 X10E3/UL (ref 0–0.4)
EOSINOPHIL NFR BLD AUTO: 3 %
ERYTHROCYTE [DISTWIDTH] IN BLOOD BY AUTOMATED COUNT: 13.1 % (ref 11.7–15.4)
GLOBULIN SER CALC-MCNC: 2.3 G/DL (ref 1.5–4.5)
GLUCOSE SERPL-MCNC: 138 MG/DL (ref 65–99)
HBA1C MFR BLD: 6.5 % (ref 4.8–5.6)
HCT VFR BLD AUTO: 40.2 % (ref 34–46.6)
HDLC SERPL-MCNC: 41 MG/DL
HGB BLD-MCNC: 13.3 G/DL (ref 11.1–15.9)
IMM GRANULOCYTES # BLD AUTO: 0 X10E3/UL (ref 0–0.1)
IMM GRANULOCYTES NFR BLD AUTO: 0 %
LDLC SERPL CALC-MCNC: 64 MG/DL (ref 0–99)
LYMPHOCYTES # BLD AUTO: 1.5 X10E3/UL (ref 0.7–3.1)
LYMPHOCYTES NFR BLD AUTO: 24 %
MCH RBC QN AUTO: 30.3 PG (ref 26.6–33)
MCHC RBC AUTO-ENTMCNC: 33.1 G/DL (ref 31.5–35.7)
MCV RBC AUTO: 92 FL (ref 79–97)
MONOCYTES # BLD AUTO: 0.5 X10E3/UL (ref 0.1–0.9)
MONOCYTES NFR BLD AUTO: 9 %
NEUTROPHILS # BLD AUTO: 3.9 X10E3/UL (ref 1.4–7)
NEUTROPHILS NFR BLD AUTO: 63 %
PLATELET # BLD AUTO: 273 X10E3/UL (ref 150–450)
POTASSIUM SERPL-SCNC: 4.5 MMOL/L (ref 3.5–5.2)
PROT SERPL-MCNC: 6.7 G/DL (ref 6–8.5)
RBC # BLD AUTO: 4.39 X10E6/UL (ref 3.77–5.28)
SODIUM SERPL-SCNC: 140 MMOL/L (ref 134–144)
TRIGL SERPL-MCNC: 187 MG/DL (ref 0–149)
TSH SERPL DL<=0.005 MIU/L-ACNC: 1.88 UIU/ML (ref 0.45–4.5)
VLDLC SERPL CALC-MCNC: 31 MG/DL (ref 5–40)
WBC # BLD AUTO: 6.2 X10E3/UL (ref 3.4–10.8)

## 2022-04-03 DIAGNOSIS — I10 ESSENTIAL HYPERTENSION: ICD-10-CM

## 2022-04-03 DIAGNOSIS — E78.2 MIXED HYPERLIPIDEMIA: ICD-10-CM

## 2022-04-04 RX ORDER — ATENOLOL 50 MG/1
TABLET ORAL
Qty: 90 TABLET | Refills: 0 | Status: SHIPPED | OUTPATIENT
Start: 2022-04-04 | End: 2022-06-28

## 2022-04-04 RX ORDER — ROSUVASTATIN CALCIUM 5 MG/1
TABLET, COATED ORAL
Qty: 90 TABLET | Refills: 0 | Status: SHIPPED | OUTPATIENT
Start: 2022-04-04 | End: 2022-06-28

## 2022-04-11 ENCOUNTER — OFFICE VISIT (OUTPATIENT)
Dept: FAMILY MEDICINE CLINIC | Facility: CLINIC | Age: 62
End: 2022-04-11

## 2022-04-11 VITALS
WEIGHT: 228 LBS | HEART RATE: 78 BPM | HEIGHT: 66 IN | BODY MASS INDEX: 36.64 KG/M2 | SYSTOLIC BLOOD PRESSURE: 128 MMHG | OXYGEN SATURATION: 98 % | TEMPERATURE: 97.8 F | DIASTOLIC BLOOD PRESSURE: 82 MMHG

## 2022-04-11 DIAGNOSIS — E03.9 ACQUIRED HYPOTHYROIDISM: ICD-10-CM

## 2022-04-11 DIAGNOSIS — E55.9 VITAMIN D DEFICIENCY: ICD-10-CM

## 2022-04-11 DIAGNOSIS — E66.01 MORBID EXOGENOUS OBESITY: ICD-10-CM

## 2022-04-11 DIAGNOSIS — E11.9 CONTROLLED TYPE 2 DIABETES MELLITUS WITHOUT COMPLICATION, WITHOUT LONG-TERM CURRENT USE OF INSULIN: Primary | ICD-10-CM

## 2022-04-11 DIAGNOSIS — E78.2 MIXED HYPERLIPIDEMIA: ICD-10-CM

## 2022-04-11 DIAGNOSIS — I10 ESSENTIAL HYPERTENSION: ICD-10-CM

## 2022-04-11 PROCEDURE — 99214 OFFICE O/P EST MOD 30 MIN: CPT | Performed by: FAMILY MEDICINE

## 2022-04-11 RX ORDER — METFORMIN HYDROCHLORIDE 750 MG/1
TABLET, EXTENDED RELEASE ORAL
Qty: 180 TABLET | Refills: 1 | Status: SHIPPED | OUTPATIENT
Start: 2022-04-11 | End: 2022-10-03

## 2022-04-11 NOTE — PROGRESS NOTES
Subjective   Marlena Waite is a 62 y.o. female with   Chief Complaint   Patient presents with   • Hypertension   • Hyperlipidemia   .    History of Present Illness   62-year-old white female with multiple medical issues here for further medical management.  Patient with known history of essential hypertension, hyperlipidemia as well as hypothyroidism and type II non-insulin-dependent diabetes mellitus.  Current medications include atenolol, Euthyrox, Metformin ER as well as rosuvastatin.  All medications are used on a regular basis and are well-tolerated without side effects.  Patient with exogenous obesity and unfortunately continuing to gain weight.  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  Fasting labs have been acquired prior to this visit.  She does admit that she is likely to become more active on an exercise basis and working outside around her house after the tax issues are over within the next 7 to 8 days.  The following portions of the patient's history were reviewed and updated as appropriate: allergies, current medications, past family history, past medical history, past social history, past surgical history and problem list.    Review of Systems   Cardiovascular:        Hypertension, hyperlipidemia   Endocrine:        Exogenous obesity, vitamin D deficiency, type II non-insulin-dependent diabetes mellitus, hypothyroidism       Objective     Vitals:    04/11/22 0815   BP: 128/82   Pulse: 78   Temp: 97.8 °F (36.6 °C)   SpO2: 98%       Recent Results (from the past 672 hour(s))   CBC w AUTO Differential    Collection Time: 04/01/22  9:15 AM    Specimen: Blood   Result Value Ref Range    WBC 6.2 3.4 - 10.8 x10E3/uL    RBC 4.39 3.77 - 5.28 x10E6/uL    Hemoglobin 13.3 11.1 - 15.9 g/dL    Hematocrit 40.2 34.0 - 46.6 %    MCV 92 79 - 97 fL    MCH 30.3 26.6 - 33.0 pg    MCHC 33.1  31.5 - 35.7 g/dL    RDW 13.1 11.7 - 15.4 %    Platelets 273 150 - 450 x10E3/uL    Neutrophil Rel % 63 Not Estab. %    Lymphocyte Rel % 24 Not Estab. %    Monocyte Rel % 9 Not Estab. %    Eosinophil Rel % 3 Not Estab. %    Basophil Rel % 1 Not Estab. %    Neutrophils Absolute 3.9 1.4 - 7.0 x10E3/uL    Lymphocytes Absolute 1.5 0.7 - 3.1 x10E3/uL    Monocytes Absolute 0.5 0.1 - 0.9 x10E3/uL    Eosinophils Absolute 0.2 0.0 - 0.4 x10E3/uL    Basophils Absolute 0.0 0.0 - 0.2 x10E3/uL    Immature Granulocyte Rel % 0 Not Estab. %    Immature Grans Absolute 0.0 0.0 - 0.1 x10E3/uL   TSH    Collection Time: 04/01/22  9:15 AM    Specimen: Blood   Result Value Ref Range    TSH 1.880 0.450 - 4.500 uIU/mL   Vitamin D 25 hydroxy    Collection Time: 04/01/22  9:15 AM    Specimen: Blood   Result Value Ref Range    25 Hydroxy, Vitamin D 56.5 30.0 - 100.0 ng/mL   Comprehensive metabolic panel    Collection Time: 04/01/22  9:15 AM    Specimen: Blood   Result Value Ref Range    Glucose 138 (H) 65 - 99 mg/dL    BUN 15 8 - 27 mg/dL    Creatinine 0.86 0.57 - 1.00 mg/dL    EGFR Result 76 >59 mL/min/1.73    BUN/Creatinine Ratio 17 12 - 28    Sodium 140 134 - 144 mmol/L    Potassium 4.5 3.5 - 5.2 mmol/L    Chloride 100 96 - 106 mmol/L    Total CO2 25 20 - 29 mmol/L    Calcium 10.9 (H) 8.7 - 10.3 mg/dL    Total Protein 6.7 6.0 - 8.5 g/dL    Albumin 4.4 3.8 - 4.8 g/dL    Globulin 2.3 1.5 - 4.5 g/dL    A/G Ratio 1.9 1.2 - 2.2    Total Bilirubin 0.6 0.0 - 1.2 mg/dL    Alkaline Phosphatase 57 44 - 121 IU/L    AST (SGOT) 19 0 - 40 IU/L    ALT (SGPT) 21 0 - 32 IU/L   Hemoglobin A1c    Collection Time: 04/01/22  9:15 AM    Specimen: Blood   Result Value Ref Range    Hemoglobin A1C 6.5 (H) 4.8 - 5.6 %   Lipid panel    Collection Time: 04/01/22  9:15 AM    Specimen: Blood   Result Value Ref Range    Total Cholesterol 136 100 - 199 mg/dL    Triglycerides 187 (H) 0 - 149 mg/dL    HDL Cholesterol 41 >39 mg/dL    VLDL Cholesterol Balta 31 5 - 40 mg/dL    LDL  Chol Calc (NIH) 64 0 - 99 mg/dL       Physical Exam  Vitals and nursing note reviewed.   Constitutional:       Appearance: Normal appearance. She is well-developed and well-groomed. She is obese.      Comments: Exogenous obesity with a BMI of 37.4   HENT:      Head: Normocephalic and atraumatic.   Neck:      Thyroid: No thyroid mass or thyromegaly.      Vascular: Normal carotid pulses. No carotid bruit.      Trachea: Trachea and phonation normal.   Cardiovascular:      Rate and Rhythm: Normal rate and regular rhythm.      Heart sounds: Normal heart sounds. No murmur heard.    No friction rub. No gallop.   Pulmonary:      Effort: Pulmonary effort is normal. No respiratory distress.      Breath sounds: Normal breath sounds. No decreased breath sounds, wheezing, rhonchi or rales.   Musculoskeletal:      Cervical back: Neck supple.   Lymphadenopathy:      Cervical: No cervical adenopathy.   Skin:     General: Skin is warm and dry.      Findings: No rash.   Neurological:      Mental Status: She is alert and oriented to person, place, and time.   Psychiatric:         Attention and Perception: Attention and perception normal.         Mood and Affect: Mood and affect normal.         Speech: Speech normal.         Behavior: Behavior normal. Behavior is cooperative.         Thought Content: Thought content normal.         Cognition and Memory: Cognition and memory normal.         Judgment: Judgment normal.         Assessment/Plan   Diagnoses and all orders for this visit:    1. Controlled type 2 diabetes mellitus without complication, without long-term current use of insulin (HCC) (Primary)  -     metFORMIN ER (GLUCOPHAGE-XR) 750 MG 24 hr tablet; Take 2 tablets by mouth once daily  Dispense: 180 tablet; Refill: 1  -     Lipid panel; Future  -     Hemoglobin A1c; Future  -     Comprehensive metabolic panel; Future  -     Vitamin D 25 hydroxy; Future  -     TSH; Future  -     CBC w AUTO Differential; Future    2. Acquired  hypothyroidism  -     Lipid panel; Future  -     Hemoglobin A1c; Future  -     Comprehensive metabolic panel; Future  -     Vitamin D 25 hydroxy; Future  -     TSH; Future  -     CBC w AUTO Differential; Future    3. Vitamin D deficiency  -     Lipid panel; Future  -     Hemoglobin A1c; Future  -     Comprehensive metabolic panel; Future  -     Vitamin D 25 hydroxy; Future  -     TSH; Future  -     CBC w AUTO Differential; Future    4. Morbid exogenous obesity (HCC)  -     Lipid panel; Future  -     Hemoglobin A1c; Future  -     Comprehensive metabolic panel; Future  -     Vitamin D 25 hydroxy; Future  -     TSH; Future  -     CBC w AUTO Differential; Future    5. Essential hypertension  -     Lipid panel; Future  -     Hemoglobin A1c; Future  -     Comprehensive metabolic panel; Future  -     Vitamin D 25 hydroxy; Future  -     TSH; Future  -     CBC w AUTO Differential; Future    6. Mixed hyperlipidemia  -     Lipid panel; Future  -     Hemoglobin A1c; Future  -     Comprehensive metabolic panel; Future  -     Vitamin D 25 hydroxy; Future  -     TSH; Future  -     CBC w AUTO Differential; Future      Weight loss would be beneficial especially in regards to her sugar status.  All other current medications will be continued without alteration.  Anticipate follow-up in 6 months preceded by fasting labs.  Return in about 6 months (around 10/11/2022) for Recheck.

## 2022-06-12 DIAGNOSIS — E03.9 ACQUIRED HYPOTHYROIDISM: ICD-10-CM

## 2022-06-13 RX ORDER — LEVOTHYROXINE SODIUM 75 UG/1
TABLET ORAL
Qty: 90 TABLET | Refills: 1 | Status: SHIPPED | OUTPATIENT
Start: 2022-06-13 | End: 2022-12-12

## 2022-06-28 DIAGNOSIS — E78.2 MIXED HYPERLIPIDEMIA: ICD-10-CM

## 2022-06-28 DIAGNOSIS — I10 ESSENTIAL HYPERTENSION: ICD-10-CM

## 2022-06-28 RX ORDER — ROSUVASTATIN CALCIUM 5 MG/1
TABLET, COATED ORAL
Qty: 90 TABLET | Refills: 0 | Status: SHIPPED | OUTPATIENT
Start: 2022-06-28 | End: 2022-09-26

## 2022-06-28 RX ORDER — ATENOLOL 50 MG/1
TABLET ORAL
Qty: 90 TABLET | Refills: 0 | Status: SHIPPED | OUTPATIENT
Start: 2022-06-28 | End: 2022-09-26

## 2022-09-23 DIAGNOSIS — E78.2 MIXED HYPERLIPIDEMIA: ICD-10-CM

## 2022-09-23 DIAGNOSIS — I10 ESSENTIAL HYPERTENSION: ICD-10-CM

## 2022-09-26 RX ORDER — ATENOLOL 50 MG/1
TABLET ORAL
Qty: 90 TABLET | Refills: 1 | Status: SHIPPED | OUTPATIENT
Start: 2022-09-26

## 2022-09-26 RX ORDER — ROSUVASTATIN CALCIUM 5 MG/1
TABLET, COATED ORAL
Qty: 90 TABLET | Refills: 1 | Status: SHIPPED | OUTPATIENT
Start: 2022-09-26

## 2022-10-03 DIAGNOSIS — E11.9 CONTROLLED TYPE 2 DIABETES MELLITUS WITHOUT COMPLICATION, WITHOUT LONG-TERM CURRENT USE OF INSULIN: ICD-10-CM

## 2022-10-03 RX ORDER — METFORMIN HYDROCHLORIDE 750 MG/1
TABLET, EXTENDED RELEASE ORAL
Qty: 180 TABLET | Refills: 0 | Status: SHIPPED | OUTPATIENT
Start: 2022-10-03 | End: 2023-02-06

## 2022-10-10 DIAGNOSIS — E55.9 VITAMIN D DEFICIENCY: ICD-10-CM

## 2022-10-10 DIAGNOSIS — E78.2 MIXED HYPERLIPIDEMIA: ICD-10-CM

## 2022-10-10 DIAGNOSIS — I10 ESSENTIAL HYPERTENSION: ICD-10-CM

## 2022-10-10 DIAGNOSIS — E11.9 CONTROLLED TYPE 2 DIABETES MELLITUS WITHOUT COMPLICATION, WITHOUT LONG-TERM CURRENT USE OF INSULIN: ICD-10-CM

## 2022-10-10 DIAGNOSIS — E03.9 ACQUIRED HYPOTHYROIDISM: ICD-10-CM

## 2022-10-10 DIAGNOSIS — E66.01 MORBID EXOGENOUS OBESITY: ICD-10-CM

## 2022-10-12 LAB
25(OH)D3+25(OH)D2 SERPL-MCNC: 50.5 NG/ML (ref 30–100)
ALBUMIN SERPL-MCNC: 4.6 G/DL (ref 3.5–5.2)
ALBUMIN/GLOB SERPL: 2.2 G/DL
ALP SERPL-CCNC: 57 U/L (ref 39–117)
ALT SERPL-CCNC: 33 U/L (ref 1–33)
AST SERPL-CCNC: 22 U/L (ref 1–32)
BASOPHILS # BLD AUTO: 0.04 10*3/MM3 (ref 0–0.2)
BASOPHILS NFR BLD AUTO: 0.7 % (ref 0–1.5)
BILIRUB SERPL-MCNC: 0.6 MG/DL (ref 0–1.2)
BUN SERPL-MCNC: 10 MG/DL (ref 8–23)
BUN/CREAT SERPL: 13.2 (ref 7–25)
CALCIUM SERPL-MCNC: 10 MG/DL (ref 8.6–10.5)
CHLORIDE SERPL-SCNC: 101 MMOL/L (ref 98–107)
CHOLEST SERPL-MCNC: 145 MG/DL (ref 0–200)
CO2 SERPL-SCNC: 28 MMOL/L (ref 22–29)
CREAT SERPL-MCNC: 0.76 MG/DL (ref 0.57–1)
EGFRCR SERPLBLD CKD-EPI 2021: 87.6 ML/MIN/1.73
EOSINOPHIL # BLD AUTO: 0.16 10*3/MM3 (ref 0–0.4)
EOSINOPHIL NFR BLD AUTO: 2.6 % (ref 0.3–6.2)
ERYTHROCYTE [DISTWIDTH] IN BLOOD BY AUTOMATED COUNT: 13.4 % (ref 12.3–15.4)
GLOBULIN SER CALC-MCNC: 2.1 GM/DL
GLUCOSE SERPL-MCNC: 176 MG/DL (ref 65–99)
HBA1C MFR BLD: 7.2 % (ref 4.8–5.6)
HCT VFR BLD AUTO: 42.5 % (ref 34–46.6)
HDLC SERPL-MCNC: 42 MG/DL (ref 40–60)
HGB BLD-MCNC: 14.2 G/DL (ref 12–15.9)
IMM GRANULOCYTES # BLD AUTO: 0.02 10*3/MM3 (ref 0–0.05)
IMM GRANULOCYTES NFR BLD AUTO: 0.3 % (ref 0–0.5)
LDLC SERPL CALC-MCNC: 63 MG/DL (ref 0–100)
LYMPHOCYTES # BLD AUTO: 1.31 10*3/MM3 (ref 0.7–3.1)
LYMPHOCYTES NFR BLD AUTO: 21.5 % (ref 19.6–45.3)
MCH RBC QN AUTO: 30.5 PG (ref 26.6–33)
MCHC RBC AUTO-ENTMCNC: 33.4 G/DL (ref 31.5–35.7)
MCV RBC AUTO: 91.2 FL (ref 79–97)
MONOCYTES # BLD AUTO: 0.5 10*3/MM3 (ref 0.1–0.9)
MONOCYTES NFR BLD AUTO: 8.2 % (ref 5–12)
NEUTROPHILS # BLD AUTO: 4.05 10*3/MM3 (ref 1.7–7)
NEUTROPHILS NFR BLD AUTO: 66.7 % (ref 42.7–76)
NRBC BLD AUTO-RTO: 0 /100 WBC (ref 0–0.2)
PLATELET # BLD AUTO: 241 10*3/MM3 (ref 140–450)
POTASSIUM SERPL-SCNC: 4.8 MMOL/L (ref 3.5–5.2)
PROT SERPL-MCNC: 6.7 G/DL (ref 6–8.5)
RBC # BLD AUTO: 4.66 10*6/MM3 (ref 3.77–5.28)
SODIUM SERPL-SCNC: 139 MMOL/L (ref 136–145)
TRIGL SERPL-MCNC: 250 MG/DL (ref 0–150)
TSH SERPL DL<=0.005 MIU/L-ACNC: 2.39 UIU/ML (ref 0.27–4.2)
VLDLC SERPL CALC-MCNC: 40 MG/DL (ref 5–40)
WBC # BLD AUTO: 6.08 10*3/MM3 (ref 3.4–10.8)

## 2022-10-17 ENCOUNTER — OFFICE VISIT (OUTPATIENT)
Dept: FAMILY MEDICINE CLINIC | Facility: CLINIC | Age: 62
End: 2022-10-17

## 2022-10-17 VITALS
OXYGEN SATURATION: 97 % | SYSTOLIC BLOOD PRESSURE: 128 MMHG | WEIGHT: 230 LBS | HEIGHT: 66 IN | HEART RATE: 76 BPM | TEMPERATURE: 97.7 F | BODY MASS INDEX: 36.96 KG/M2 | DIASTOLIC BLOOD PRESSURE: 80 MMHG

## 2022-10-17 DIAGNOSIS — I10 ESSENTIAL HYPERTENSION: Primary | ICD-10-CM

## 2022-10-17 DIAGNOSIS — E55.9 VITAMIN D DEFICIENCY: ICD-10-CM

## 2022-10-17 DIAGNOSIS — E66.01 MORBID EXOGENOUS OBESITY: ICD-10-CM

## 2022-10-17 DIAGNOSIS — E03.9 ACQUIRED HYPOTHYROIDISM: ICD-10-CM

## 2022-10-17 DIAGNOSIS — E11.9 CONTROLLED TYPE 2 DIABETES MELLITUS WITHOUT COMPLICATION, WITHOUT LONG-TERM CURRENT USE OF INSULIN: ICD-10-CM

## 2022-10-17 DIAGNOSIS — E78.2 MIXED HYPERLIPIDEMIA: ICD-10-CM

## 2022-10-17 PROCEDURE — 99213 OFFICE O/P EST LOW 20 MIN: CPT | Performed by: FAMILY MEDICINE

## 2022-10-17 NOTE — PROGRESS NOTES
Subjective   Marlena Waite is a 62 y.o. female with   Chief Complaint   Patient presents with   • Diabetes   • Hypertension   .    History of Present Illness   62-year-old white female with known history of essential hypertension and type II non-insulin-dependent diabetes mellitus here for further medical management.  All medications are as listed and are used appropriately.  All medications are used without side effects.  Patient overall has been doing well with a slight weight gain recently.  She however states that she has had a hour change at work-she is no longer working nights and is on day shift.  She is very much enjoying her job and is more enthused about taking care of her old self.  She understands that her sugar has not been to goal but believes with exercise and an improved diet that she will be able to get this in a more reasonable place before next blood draw.  The following portions of the patient's history were reviewed and updated as appropriate: allergies, current medications, past family history, past medical history, past social history, past surgical history and problem list.    Review of Systems   Cardiovascular:        Essential hypertension, hyperlipidemia   Endocrine:        Exogenous obesity, vitamin D deficiency, hypothyroidism, type II non-insulin-dependent diabetes mellitus.       Objective     Vitals:    10/17/22 0853   BP: 128/80   Pulse: 76   Temp: 97.7 °F (36.5 °C)   SpO2: 97%       Recent Results (from the past 672 hour(s))   CBC w AUTO Differential    Collection Time: 10/11/22  8:26 AM    Specimen: Blood   Result Value Ref Range    WBC 6.08 3.40 - 10.80 10*3/mm3    RBC 4.66 3.77 - 5.28 10*6/mm3    Hemoglobin 14.2 12.0 - 15.9 g/dL    Hematocrit 42.5 34.0 - 46.6 %    MCV 91.2 79.0 - 97.0 fL    MCH 30.5 26.6 - 33.0 pg    MCHC 33.4 31.5 - 35.7 g/dL    RDW 13.4 12.3 - 15.4 %    Platelets 241 140 - 450 10*3/mm3    Neutrophil Rel % 66.7 42.7 - 76.0 %    Lymphocyte Rel % 21.5 19.6 -  45.3 %    Monocyte Rel % 8.2 5.0 - 12.0 %    Eosinophil Rel % 2.6 0.3 - 6.2 %    Basophil Rel % 0.7 0.0 - 1.5 %    Neutrophils Absolute 4.05 1.70 - 7.00 10*3/mm3    Lymphocytes Absolute 1.31 0.70 - 3.10 10*3/mm3    Monocytes Absolute 0.50 0.10 - 0.90 10*3/mm3    Eosinophils Absolute 0.16 0.00 - 0.40 10*3/mm3    Basophils Absolute 0.04 0.00 - 0.20 10*3/mm3    Immature Granulocyte Rel % 0.3 0.0 - 0.5 %    Immature Grans Absolute 0.02 0.00 - 0.05 10*3/mm3    nRBC 0.0 0.0 - 0.2 /100 WBC   TSH    Collection Time: 10/11/22  8:26 AM    Specimen: Blood   Result Value Ref Range    TSH 2.390 0.270 - 4.200 uIU/mL   Vitamin D 25 hydroxy    Collection Time: 10/11/22  8:26 AM    Specimen: Blood   Result Value Ref Range    25 Hydroxy, Vitamin D 50.5 30.0 - 100.0 ng/ml   Comprehensive metabolic panel    Collection Time: 10/11/22  8:26 AM    Specimen: Blood   Result Value Ref Range    Glucose 176 (H) 65 - 99 mg/dL    BUN 10 8 - 23 mg/dL    Creatinine 0.76 0.57 - 1.00 mg/dL    EGFR Result 87.6 >60.0 mL/min/1.73    BUN/Creatinine Ratio 13.2 7.0 - 25.0    Sodium 139 136 - 145 mmol/L    Potassium 4.8 3.5 - 5.2 mmol/L    Chloride 101 98 - 107 mmol/L    Total CO2 28.0 22.0 - 29.0 mmol/L    Calcium 10.0 8.6 - 10.5 mg/dL    Total Protein 6.7 6.0 - 8.5 g/dL    Albumin 4.60 3.50 - 5.20 g/dL    Globulin 2.1 gm/dL    A/G Ratio 2.2 g/dL    Total Bilirubin 0.6 0.0 - 1.2 mg/dL    Alkaline Phosphatase 57 39 - 117 U/L    AST (SGOT) 22 1 - 32 U/L    ALT (SGPT) 33 1 - 33 U/L   Hemoglobin A1c    Collection Time: 10/11/22  8:26 AM    Specimen: Blood   Result Value Ref Range    Hemoglobin A1C 7.20 (H) 4.80 - 5.60 %   Lipid panel    Collection Time: 10/11/22  8:26 AM    Specimen: Blood   Result Value Ref Range    Total Cholesterol 145 0 - 200 mg/dL    Triglycerides 250 (H) 0 - 150 mg/dL    HDL Cholesterol 42 40 - 60 mg/dL    VLDL Cholesterol Balta 40 5 - 40 mg/dL    LDL Chol Calc (NIH) 63 0 - 100 mg/dL       Physical Exam  Vitals and nursing note reviewed.    Constitutional:       Appearance: Normal appearance. She is well-developed and well-groomed. She is obese.      Comments: Exogenous obesity with a BMI of 37.7-2 pound weight gain since April of this year.   HENT:      Head: Normocephalic and atraumatic.   Neck:      Thyroid: No thyroid mass or thyromegaly.      Vascular: Normal carotid pulses. No carotid bruit.      Trachea: Trachea and phonation normal.   Cardiovascular:      Rate and Rhythm: Normal rate and regular rhythm.      Heart sounds: Normal heart sounds. No murmur heard.    No friction rub. No gallop.   Pulmonary:      Effort: Pulmonary effort is normal. No respiratory distress.      Breath sounds: Normal breath sounds. No decreased breath sounds, wheezing, rhonchi or rales.   Musculoskeletal:      Cervical back: Neck supple.   Lymphadenopathy:      Cervical: No cervical adenopathy.   Skin:     General: Skin is warm and dry.      Findings: No rash.   Neurological:      Mental Status: She is alert and oriented to person, place, and time.   Psychiatric:         Attention and Perception: Attention and perception normal.         Mood and Affect: Mood and affect normal.         Speech: Speech normal.         Behavior: Behavior normal. Behavior is cooperative.         Thought Content: Thought content normal.         Cognition and Memory: Cognition and memory normal.         Judgment: Judgment normal.         Assessment & Plan   Diagnoses and all orders for this visit:    1. Essential hypertension (Primary)  -     Hemoglobin A1c; Future  -     Comprehensive metabolic panel; Future  -     Vitamin D 25 hydroxy; Future  -     TSH; Future  -     Lipid panel; Future  -     CBC w AUTO Differential; Future    2. Mixed hyperlipidemia  -     Hemoglobin A1c; Future  -     Comprehensive metabolic panel; Future  -     Vitamin D 25 hydroxy; Future  -     TSH; Future  -     Lipid panel; Future  -     CBC w AUTO Differential; Future    3. Acquired hypothyroidism  -      Hemoglobin A1c; Future  -     Comprehensive metabolic panel; Future  -     Vitamin D 25 hydroxy; Future  -     TSH; Future  -     Lipid panel; Future  -     CBC w AUTO Differential; Future    4. Controlled type 2 diabetes mellitus without complication, without long-term current use of insulin (HCC)  -     Hemoglobin A1c; Future  -     Comprehensive metabolic panel; Future  -     Vitamin D 25 hydroxy; Future  -     TSH; Future  -     Lipid panel; Future  -     CBC w AUTO Differential; Future    5. Morbid exogenous obesity (HCC)  -     Hemoglobin A1c; Future  -     Comprehensive metabolic panel; Future  -     Vitamin D 25 hydroxy; Future  -     TSH; Future  -     Lipid panel; Future  -     CBC w AUTO Differential; Future    6. Vitamin D deficiency  -     Hemoglobin A1c; Future  -     Comprehensive metabolic panel; Future  -     Vitamin D 25 hydroxy; Future  -     TSH; Future  -     Lipid panel; Future  -     CBC w AUTO Differential; Future    Long discussion with patient in regards to different strategies with addition of medication for sugar.  She very much would like to see if she can lose weight with improved lifestyle- better diet and exercise program.  We will allow her 6 months to accomplish this with fasting labs at that time and follow-up with me thereafter.    Return in about 6 months (around 4/17/2023) for Recheck.

## 2022-12-12 DIAGNOSIS — E03.9 ACQUIRED HYPOTHYROIDISM: ICD-10-CM

## 2022-12-12 RX ORDER — LEVOTHYROXINE SODIUM 0.07 MG/1
TABLET ORAL
Qty: 90 TABLET | Refills: 1 | Status: SHIPPED | OUTPATIENT
Start: 2022-12-12

## 2023-02-05 DIAGNOSIS — E11.9 CONTROLLED TYPE 2 DIABETES MELLITUS WITHOUT COMPLICATION, WITHOUT LONG-TERM CURRENT USE OF INSULIN: ICD-10-CM

## 2023-02-06 RX ORDER — METFORMIN HYDROCHLORIDE 750 MG/1
TABLET, EXTENDED RELEASE ORAL
Qty: 180 TABLET | Refills: 0 | Status: SHIPPED | OUTPATIENT
Start: 2023-02-06

## 2023-04-07 DIAGNOSIS — E55.9 VITAMIN D DEFICIENCY: ICD-10-CM

## 2023-04-07 DIAGNOSIS — E03.9 ACQUIRED HYPOTHYROIDISM: ICD-10-CM

## 2023-04-07 DIAGNOSIS — E78.2 MIXED HYPERLIPIDEMIA: ICD-10-CM

## 2023-04-07 DIAGNOSIS — E66.01 MORBID EXOGENOUS OBESITY: ICD-10-CM

## 2023-04-07 DIAGNOSIS — I10 ESSENTIAL HYPERTENSION: ICD-10-CM

## 2023-04-07 DIAGNOSIS — E11.9 CONTROLLED TYPE 2 DIABETES MELLITUS WITHOUT COMPLICATION, WITHOUT LONG-TERM CURRENT USE OF INSULIN: ICD-10-CM

## 2023-04-20 LAB
25(OH)D3+25(OH)D2 SERPL-MCNC: 57.1 NG/ML (ref 30–100)
ALBUMIN SERPL-MCNC: 4.7 G/DL (ref 3.5–5.2)
ALBUMIN/GLOB SERPL: 2.5 G/DL
ALP SERPL-CCNC: 54 U/L (ref 39–117)
ALT SERPL-CCNC: 20 U/L (ref 1–33)
AST SERPL-CCNC: 17 U/L (ref 1–32)
BASOPHILS # BLD AUTO: 0.02 10*3/MM3 (ref 0–0.2)
BASOPHILS NFR BLD AUTO: 0.5 % (ref 0–1.5)
BILIRUB SERPL-MCNC: 0.8 MG/DL (ref 0–1.2)
BUN SERPL-MCNC: 18 MG/DL (ref 8–23)
BUN/CREAT SERPL: 22.8 (ref 7–25)
CALCIUM SERPL-MCNC: 10.3 MG/DL (ref 8.6–10.5)
CHLORIDE SERPL-SCNC: 104 MMOL/L (ref 98–107)
CHOLEST SERPL-MCNC: 146 MG/DL (ref 0–200)
CO2 SERPL-SCNC: 25.4 MMOL/L (ref 22–29)
CREAT SERPL-MCNC: 0.79 MG/DL (ref 0.57–1)
EGFRCR SERPLBLD CKD-EPI 2021: 83.1 ML/MIN/1.73
EOSINOPHIL # BLD AUTO: 0.1 10*3/MM3 (ref 0–0.4)
EOSINOPHIL NFR BLD AUTO: 2.4 % (ref 0.3–6.2)
ERYTHROCYTE [DISTWIDTH] IN BLOOD BY AUTOMATED COUNT: 13 % (ref 12.3–15.4)
GLOBULIN SER CALC-MCNC: 1.9 GM/DL
GLUCOSE SERPL-MCNC: 136 MG/DL (ref 65–99)
HBA1C MFR BLD: 5.9 % (ref 4.8–5.6)
HCT VFR BLD AUTO: 42.2 % (ref 34–46.6)
HDLC SERPL-MCNC: 47 MG/DL (ref 40–60)
HGB BLD-MCNC: 14 G/DL (ref 12–15.9)
IMM GRANULOCYTES # BLD AUTO: 0 10*3/MM3 (ref 0–0.05)
IMM GRANULOCYTES NFR BLD AUTO: 0 % (ref 0–0.5)
LDLC SERPL CALC-MCNC: 69 MG/DL (ref 0–100)
LYMPHOCYTES # BLD AUTO: 1.03 10*3/MM3 (ref 0.7–3.1)
LYMPHOCYTES NFR BLD AUTO: 24.9 % (ref 19.6–45.3)
MCH RBC QN AUTO: 30.5 PG (ref 26.6–33)
MCHC RBC AUTO-ENTMCNC: 33.2 G/DL (ref 31.5–35.7)
MCV RBC AUTO: 91.9 FL (ref 79–97)
MONOCYTES # BLD AUTO: 0.3 10*3/MM3 (ref 0.1–0.9)
MONOCYTES NFR BLD AUTO: 7.2 % (ref 5–12)
NEUTROPHILS # BLD AUTO: 2.69 10*3/MM3 (ref 1.7–7)
NEUTROPHILS NFR BLD AUTO: 65 % (ref 42.7–76)
NRBC BLD AUTO-RTO: 0 /100 WBC (ref 0–0.2)
PLATELET # BLD AUTO: 223 10*3/MM3 (ref 140–450)
POTASSIUM SERPL-SCNC: 4.6 MMOL/L (ref 3.5–5.2)
PROT SERPL-MCNC: 6.6 G/DL (ref 6–8.5)
RBC # BLD AUTO: 4.59 10*6/MM3 (ref 3.77–5.28)
SODIUM SERPL-SCNC: 142 MMOL/L (ref 136–145)
TRIGL SERPL-MCNC: 176 MG/DL (ref 0–150)
TSH SERPL DL<=0.005 MIU/L-ACNC: 1.98 UIU/ML (ref 0.27–4.2)
VLDLC SERPL CALC-MCNC: 30 MG/DL (ref 5–40)
WBC # BLD AUTO: 4.14 10*3/MM3 (ref 3.4–10.8)

## 2023-04-23 DIAGNOSIS — I10 ESSENTIAL HYPERTENSION: ICD-10-CM

## 2023-04-23 DIAGNOSIS — E78.2 MIXED HYPERLIPIDEMIA: ICD-10-CM

## 2023-04-24 RX ORDER — ROSUVASTATIN CALCIUM 5 MG/1
TABLET, COATED ORAL
Qty: 90 TABLET | Refills: 0 | Status: SHIPPED | OUTPATIENT
Start: 2023-04-24

## 2023-04-24 RX ORDER — ATENOLOL 50 MG/1
TABLET ORAL
Qty: 90 TABLET | Refills: 0 | Status: SHIPPED | OUTPATIENT
Start: 2023-04-24

## 2023-04-26 ENCOUNTER — OFFICE VISIT (OUTPATIENT)
Dept: FAMILY MEDICINE CLINIC | Facility: CLINIC | Age: 63
End: 2023-04-26
Payer: COMMERCIAL

## 2023-04-26 VITALS
WEIGHT: 206 LBS | TEMPERATURE: 97.1 F | BODY MASS INDEX: 33.11 KG/M2 | HEART RATE: 69 BPM | DIASTOLIC BLOOD PRESSURE: 80 MMHG | HEIGHT: 66 IN | OXYGEN SATURATION: 100 % | SYSTOLIC BLOOD PRESSURE: 130 MMHG

## 2023-04-26 DIAGNOSIS — I10 ESSENTIAL HYPERTENSION: Primary | ICD-10-CM

## 2023-04-26 DIAGNOSIS — Z78.0 POSTMENOPAUSAL: ICD-10-CM

## 2023-04-26 DIAGNOSIS — E78.2 MIXED HYPERLIPIDEMIA: ICD-10-CM

## 2023-04-26 DIAGNOSIS — E11.9 CONTROLLED TYPE 2 DIABETES MELLITUS WITHOUT COMPLICATION, WITHOUT LONG-TERM CURRENT USE OF INSULIN: ICD-10-CM

## 2023-04-26 DIAGNOSIS — E66.01 MORBID EXOGENOUS OBESITY: ICD-10-CM

## 2023-04-26 DIAGNOSIS — E55.9 VITAMIN D DEFICIENCY: ICD-10-CM

## 2023-04-26 DIAGNOSIS — Z12.31 ENCOUNTER FOR SCREENING MAMMOGRAM FOR MALIGNANT NEOPLASM OF BREAST: ICD-10-CM

## 2023-04-26 DIAGNOSIS — E03.9 ACQUIRED HYPOTHYROIDISM: ICD-10-CM

## 2023-04-26 RX ORDER — METFORMIN HYDROCHLORIDE 750 MG/1
TABLET, EXTENDED RELEASE ORAL
Qty: 180 TABLET | Refills: 0
Start: 2023-04-26

## 2023-04-26 NOTE — PROGRESS NOTES
Subjective   Marlena Waite is a 63 y.o. female with   Chief Complaint   Patient presents with   • Diabetes   • Hyperlipidemia   .    History of Present Illness   63-year-old white female with known history of type II non-insulin-dependent diabetes mellitus, hypertension and hyperlipidemia here for further medical management.  Patient also with history of hypothyroidism.  Current medications include 325 mg aspirin daily as well as atenolol 50 mg daily, metformin ER at 750 mg using 2 tablets daily.  Patient also uses rosuvastatin at 5 mg daily and levothyroxine at 75 mcg daily.  She does use a host of supplements and vitamins including vitamin D3.  Fasting labs have been acquired prior to this visit.  Patient has been using the [employer] lam and has lost 24 pounds since October of last year.  She is feeling very good at this new weight and attributes this to her improved numbers.  The following portions of the patient's history were reviewed and updated as appropriate: allergies, current medications, past family history, past medical history, past social history, past surgical history and problem list.    Review of Systems   Cardiovascular:        Hypertension, hyperlipidemia   Endocrine:        Exogenous obesity, vitamin D deficiency, hypothyroidism, type II non-insulin-dependent diabetes mellitus       Objective     Vitals:    04/26/23 0803   BP: 130/80   Pulse: 69   Temp: 97.1 °F (36.2 °C)   SpO2: 100%       Recent Results (from the past 672 hour(s))   CBC w AUTO Differential    Collection Time: 04/19/23  8:19 AM    Specimen: Blood   Result Value Ref Range    WBC 4.14 3.40 - 10.80 10*3/mm3    RBC 4.59 3.77 - 5.28 10*6/mm3    Hemoglobin 14.0 12.0 - 15.9 g/dL    Hematocrit 42.2 34.0 - 46.6 %    MCV 91.9 79.0 - 97.0 fL    MCH 30.5 26.6 - 33.0 pg    MCHC 33.2 31.5 - 35.7 g/dL    RDW 13.0 12.3 - 15.4 %    Platelets 223 140 - 450 10*3/mm3    Neutrophil Rel % 65.0 42.7 - 76.0 %    Lymphocyte Rel % 24.9 19.6 - 45.3 %     Monocyte Rel % 7.2 5.0 - 12.0 %    Eosinophil Rel % 2.4 0.3 - 6.2 %    Basophil Rel % 0.5 0.0 - 1.5 %    Neutrophils Absolute 2.69 1.70 - 7.00 10*3/mm3    Lymphocytes Absolute 1.03 0.70 - 3.10 10*3/mm3    Monocytes Absolute 0.30 0.10 - 0.90 10*3/mm3    Eosinophils Absolute 0.10 0.00 - 0.40 10*3/mm3    Basophils Absolute 0.02 0.00 - 0.20 10*3/mm3    Immature Granulocyte Rel % 0.0 0.0 - 0.5 %    Immature Grans Absolute 0.00 0.00 - 0.05 10*3/mm3    nRBC 0.0 0.0 - 0.2 /100 WBC   Lipid panel    Collection Time: 04/19/23  8:19 AM    Specimen: Blood   Result Value Ref Range    Total Cholesterol 146 0 - 200 mg/dL    Triglycerides 176 (H) 0 - 150 mg/dL    HDL Cholesterol 47 40 - 60 mg/dL    VLDL Cholesterol Balta 30 5 - 40 mg/dL    LDL Chol Calc (NIH) 69 0 - 100 mg/dL   TSH    Collection Time: 04/19/23  8:19 AM    Specimen: Blood   Result Value Ref Range    TSH 1.980 0.270 - 4.200 uIU/mL   Vitamin D 25 hydroxy    Collection Time: 04/19/23  8:19 AM    Specimen: Blood   Result Value Ref Range    25 Hydroxy, Vitamin D 57.1 30.0 - 100.0 ng/ml   Comprehensive metabolic panel    Collection Time: 04/19/23  8:19 AM    Specimen: Blood   Result Value Ref Range    Glucose 136 (H) 65 - 99 mg/dL    BUN 18 8 - 23 mg/dL    Creatinine 0.79 0.57 - 1.00 mg/dL    EGFR Result 83.1 >60.0 mL/min/1.73    BUN/Creatinine Ratio 22.8 7.0 - 25.0    Sodium 142 136 - 145 mmol/L    Potassium 4.6 3.5 - 5.2 mmol/L    Chloride 104 98 - 107 mmol/L    Total CO2 25.4 22.0 - 29.0 mmol/L    Calcium 10.3 8.6 - 10.5 mg/dL    Total Protein 6.6 6.0 - 8.5 g/dL    Albumin 4.7 3.5 - 5.2 g/dL    Globulin 1.9 gm/dL    A/G Ratio 2.5 g/dL    Total Bilirubin 0.8 0.0 - 1.2 mg/dL    Alkaline Phosphatase 54 39 - 117 U/L    AST (SGOT) 17 1 - 32 U/L    ALT (SGPT) 20 1 - 33 U/L   Hemoglobin A1c    Collection Time: 04/19/23  8:19 AM    Specimen: Blood   Result Value Ref Range    Hemoglobin A1C 5.90 (H) 4.80 - 5.60 %       Physical Exam  Vitals and nursing note reviewed.    Constitutional:       Appearance: Normal appearance. She is well-developed and well-groomed. She is obese.      Comments: Exogenous obesity with a BMI of 33.7- reduction of weight by 24 pounds since last visit   HENT:      Head: Normocephalic and atraumatic.   Neck:      Thyroid: No thyroid mass or thyromegaly.      Vascular: Normal carotid pulses. No carotid bruit.      Trachea: Trachea and phonation normal.   Cardiovascular:      Rate and Rhythm: Normal rate and regular rhythm.      Heart sounds: Normal heart sounds. No murmur heard.    No friction rub. No gallop.   Pulmonary:      Effort: Pulmonary effort is normal. No respiratory distress.      Breath sounds: Normal breath sounds. No decreased breath sounds, wheezing, rhonchi or rales.   Musculoskeletal:      Cervical back: Neck supple.   Lymphadenopathy:      Cervical: No cervical adenopathy.   Skin:     General: Skin is warm and dry.      Findings: No rash.   Neurological:      Mental Status: She is alert and oriented to person, place, and time.   Psychiatric:         Attention and Perception: Attention and perception normal.         Mood and Affect: Mood and affect normal.         Speech: Speech normal.         Behavior: Behavior normal. Behavior is cooperative.         Thought Content: Thought content normal.         Cognition and Memory: Cognition and memory normal.         Judgment: Judgment normal.     Patient's (Body mass index is 33.76 kg/m².) indicates that they are obese (BMI >30) with health related conditions that include obstructive sleep apnea, hypertension, coronary heart disease, diabetes mellitus, dyslipidemias, GERD, peripheral vascular disease, idiopathic intracranial hypertension, osteoarthritis, lower extremity venous stasis disease, urinary stress incontinence and peripheral vascular disease . Weight is improving with lifestyle modifications. BMI is is above average; BMI management plan is completed. We discussed portion control and  increasing exercise.     Assessment & Plan   Diagnoses and all orders for this visit:    1. Essential hypertension (Primary)  -     Hemoglobin A1c; Future  -     Comprehensive metabolic panel; Future  -     TSH; Future  -     Vitamin D 25 hydroxy; Future  -     Lipid panel; Future  -     CBC w AUTO Differential; Future    2. Mixed hyperlipidemia  -     Hemoglobin A1c; Future  -     Comprehensive metabolic panel; Future  -     TSH; Future  -     Vitamin D 25 hydroxy; Future  -     Lipid panel; Future  -     CBC w AUTO Differential; Future    3. Encounter for screening mammogram for malignant neoplasm of breast  -     Mammo Screening Bilateral With CAD; Future  -     Hemoglobin A1c; Future  -     Comprehensive metabolic panel; Future  -     TSH; Future  -     Vitamin D 25 hydroxy; Future  -     Lipid panel; Future  -     CBC w AUTO Differential; Future    4. Controlled type 2 diabetes mellitus without complication, without long-term current use of insulin  -     metFORMIN ER (GLUCOPHAGE-XR) 750 MG 24 hr tablet; 1 po qd  Dispense: 180 tablet; Refill: 0  -     Hemoglobin A1c; Future  -     Comprehensive metabolic panel; Future  -     TSH; Future  -     Vitamin D 25 hydroxy; Future  -     Lipid panel; Future  -     CBC w AUTO Differential; Future    5. Postmenopausal  -     DEXA Bone Density Axial; Future  -     Hemoglobin A1c; Future  -     Comprehensive metabolic panel; Future  -     TSH; Future  -     Vitamin D 25 hydroxy; Future  -     Lipid panel; Future  -     CBC w AUTO Differential; Future    6. Acquired hypothyroidism  -     Hemoglobin A1c; Future  -     Comprehensive metabolic panel; Future  -     TSH; Future  -     Vitamin D 25 hydroxy; Future  -     Lipid panel; Future  -     CBC w AUTO Differential; Future    7. Morbid exogenous obesity  -     Hemoglobin A1c; Future  -     Comprehensive metabolic panel; Future  -     TSH; Future  -     Vitamin D 25 hydroxy; Future  -     Lipid panel; Future  -     CBC w AUTO  Differential; Future    8. Vitamin D deficiency  -     Hemoglobin A1c; Future  -     Comprehensive metabolic panel; Future  -     TSH; Future  -     Vitamin D 25 hydroxy; Future  -     Lipid panel; Future  -     CBC w AUTO Differential; Future    Patient has been applauded on her efforts at weight loss.  This has resulted in marked improvement in all of her numbers.  Metformin ER will be reduced to 1 tablet/day with anticipation of even better numbers in the future.  Repeat fasting labs in October of this year with follow-up with me thereafter.  We will continue to try to reduce medication burden based on patient's results.    Return in about 6 months (around 10/26/2023) for Recheck.

## 2023-05-02 ENCOUNTER — TRANSCRIBE ORDERS (OUTPATIENT)
Dept: ADMINISTRATIVE | Facility: HOSPITAL | Age: 63
End: 2023-05-02
Payer: COMMERCIAL

## 2023-05-02 DIAGNOSIS — Z12.31 SCREENING MAMMOGRAM FOR BREAST CANCER: Primary | ICD-10-CM

## 2023-05-17 ENCOUNTER — APPOINTMENT (OUTPATIENT)
Dept: BONE DENSITY | Facility: HOSPITAL | Age: 63
End: 2023-05-17
Payer: COMMERCIAL

## 2023-05-17 ENCOUNTER — HOSPITAL ENCOUNTER (OUTPATIENT)
Dept: MAMMOGRAPHY | Facility: HOSPITAL | Age: 63
Discharge: HOME | End: 2023-05-17
Payer: COMMERCIAL

## 2023-05-17 DIAGNOSIS — Z78.0 POSTMENOPAUSAL: ICD-10-CM

## 2023-05-17 DIAGNOSIS — Z12.31 SCREENING MAMMOGRAM FOR BREAST CANCER: ICD-10-CM

## 2023-05-17 PROCEDURE — 77067 SCR MAMMO BI INCL CAD: CPT

## 2023-05-17 PROCEDURE — 77063 BREAST TOMOSYNTHESIS BI: CPT

## 2023-05-17 PROCEDURE — 77080 DXA BONE DENSITY AXIAL: CPT

## 2023-06-08 DIAGNOSIS — E03.9 ACQUIRED HYPOTHYROIDISM: ICD-10-CM

## 2023-06-08 RX ORDER — LEVOTHYROXINE SODIUM 0.07 MG/1
TABLET ORAL
Qty: 90 TABLET | Refills: 0 | Status: SHIPPED | OUTPATIENT
Start: 2023-06-08

## 2023-08-27 DIAGNOSIS — E03.9 ACQUIRED HYPOTHYROIDISM: ICD-10-CM

## 2023-08-28 RX ORDER — LEVOTHYROXINE SODIUM 0.07 MG/1
TABLET ORAL
Qty: 90 TABLET | Refills: 0 | Status: SHIPPED | OUTPATIENT
Start: 2023-08-28

## 2023-10-10 ENCOUNTER — HOSPITAL ENCOUNTER (OUTPATIENT)
Dept: GENERAL RADIOLOGY | Facility: HOSPITAL | Age: 63
Discharge: HOME | End: 2023-10-10
Payer: OTHER MISCELLANEOUS

## 2023-10-10 ENCOUNTER — TRANSCRIBE ORDERS (OUTPATIENT)
Dept: ADMINISTRATIVE | Facility: HOSPITAL | Age: 63
End: 2023-10-10
Payer: COMMERCIAL

## 2023-10-10 ENCOUNTER — TRANSCRIBE ORDERS (OUTPATIENT)
Dept: URGENT CARE | Facility: CLINIC | Age: 63
End: 2023-10-10
Payer: COMMERCIAL

## 2023-10-10 DIAGNOSIS — S93.402A SPRAIN OF LEFT ANKLE, INITIAL ENCOUNTER: ICD-10-CM

## 2023-10-10 DIAGNOSIS — S05.12XA: ICD-10-CM

## 2023-10-10 DIAGNOSIS — S93.402A SPRAIN OF LEFT ANKLE, INITIAL ENCOUNTER: Primary | ICD-10-CM

## 2023-10-10 PROCEDURE — 73610 X-RAY EXAM OF ANKLE: CPT

## 2023-10-10 PROCEDURE — 70150 X-RAY EXAM OF FACIAL BONES: CPT

## 2023-10-15 DIAGNOSIS — E78.2 MIXED HYPERLIPIDEMIA: ICD-10-CM

## 2023-10-15 DIAGNOSIS — I10 ESSENTIAL HYPERTENSION: ICD-10-CM

## 2023-10-16 RX ORDER — ATENOLOL 50 MG/1
TABLET ORAL
Qty: 90 TABLET | Refills: 0 | Status: SHIPPED | OUTPATIENT
Start: 2023-10-16

## 2023-10-16 RX ORDER — ROSUVASTATIN CALCIUM 5 MG/1
TABLET, COATED ORAL
Qty: 90 TABLET | Refills: 0 | Status: SHIPPED | OUTPATIENT
Start: 2023-10-16

## 2023-10-18 ENCOUNTER — LAB (OUTPATIENT)
Dept: FAMILY MEDICINE CLINIC | Facility: CLINIC | Age: 63
End: 2023-10-18
Payer: COMMERCIAL

## 2023-10-18 DIAGNOSIS — E78.2 MIXED HYPERLIPIDEMIA: ICD-10-CM

## 2023-10-18 DIAGNOSIS — Z78.0 POSTMENOPAUSAL: ICD-10-CM

## 2023-10-18 DIAGNOSIS — E03.9 ACQUIRED HYPOTHYROIDISM: ICD-10-CM

## 2023-10-18 DIAGNOSIS — E11.9 CONTROLLED TYPE 2 DIABETES MELLITUS WITHOUT COMPLICATION, WITHOUT LONG-TERM CURRENT USE OF INSULIN: ICD-10-CM

## 2023-10-18 DIAGNOSIS — E66.01 MORBID EXOGENOUS OBESITY: ICD-10-CM

## 2023-10-18 DIAGNOSIS — E55.9 VITAMIN D DEFICIENCY: ICD-10-CM

## 2023-10-18 DIAGNOSIS — I10 ESSENTIAL HYPERTENSION: ICD-10-CM

## 2023-10-18 DIAGNOSIS — Z12.31 ENCOUNTER FOR SCREENING MAMMOGRAM FOR MALIGNANT NEOPLASM OF BREAST: ICD-10-CM

## 2023-10-19 LAB
25(OH)D3+25(OH)D2 SERPL-MCNC: 33.2 NG/ML (ref 30–100)
ALBUMIN SERPL-MCNC: 4.7 G/DL (ref 3.5–5.2)
ALBUMIN/GLOB SERPL: 2.9 G/DL
ALP SERPL-CCNC: 55 U/L (ref 39–117)
ALT SERPL-CCNC: 22 U/L (ref 1–33)
AST SERPL-CCNC: 16 U/L (ref 1–32)
BASOPHILS # BLD AUTO: 0.01 10*3/MM3 (ref 0–0.2)
BASOPHILS NFR BLD AUTO: 0.2 % (ref 0–1.5)
BILIRUB SERPL-MCNC: 0.5 MG/DL (ref 0–1.2)
BUN SERPL-MCNC: 17 MG/DL (ref 8–23)
BUN/CREAT SERPL: 22.4 (ref 7–25)
CALCIUM SERPL-MCNC: 10.6 MG/DL (ref 8.6–10.5)
CHLORIDE SERPL-SCNC: 104 MMOL/L (ref 98–107)
CHOLEST SERPL-MCNC: 136 MG/DL (ref 0–200)
CO2 SERPL-SCNC: 30.3 MMOL/L (ref 22–29)
CREAT SERPL-MCNC: 0.76 MG/DL (ref 0.57–1)
EGFRCR SERPLBLD CKD-EPI 2021: 87.1 ML/MIN/1.73
EOSINOPHIL # BLD AUTO: 0.14 10*3/MM3 (ref 0–0.4)
EOSINOPHIL NFR BLD AUTO: 3 % (ref 0.3–6.2)
ERYTHROCYTE [DISTWIDTH] IN BLOOD BY AUTOMATED COUNT: 12.7 % (ref 12.3–15.4)
GLOBULIN SER CALC-MCNC: 1.6 GM/DL
GLUCOSE SERPL-MCNC: 147 MG/DL (ref 65–99)
HBA1C MFR BLD: 6.1 % (ref 4.8–5.6)
HCT VFR BLD AUTO: 40.6 % (ref 34–46.6)
HDLC SERPL-MCNC: 47 MG/DL (ref 40–60)
HGB BLD-MCNC: 14 G/DL (ref 12–15.9)
IMM GRANULOCYTES # BLD AUTO: 0.02 10*3/MM3 (ref 0–0.05)
IMM GRANULOCYTES NFR BLD AUTO: 0.4 % (ref 0–0.5)
LDLC SERPL CALC-MCNC: 59 MG/DL (ref 0–100)
LYMPHOCYTES # BLD AUTO: 1.13 10*3/MM3 (ref 0.7–3.1)
LYMPHOCYTES NFR BLD AUTO: 24.3 % (ref 19.6–45.3)
MCH RBC QN AUTO: 31.8 PG (ref 26.6–33)
MCHC RBC AUTO-ENTMCNC: 34.5 G/DL (ref 31.5–35.7)
MCV RBC AUTO: 92.3 FL (ref 79–97)
MONOCYTES # BLD AUTO: 0.4 10*3/MM3 (ref 0.1–0.9)
MONOCYTES NFR BLD AUTO: 8.6 % (ref 5–12)
NEUTROPHILS # BLD AUTO: 2.95 10*3/MM3 (ref 1.7–7)
NEUTROPHILS NFR BLD AUTO: 63.5 % (ref 42.7–76)
NRBC BLD AUTO-RTO: 0 /100 WBC (ref 0–0.2)
PLATELET # BLD AUTO: 240 10*3/MM3 (ref 140–450)
POTASSIUM SERPL-SCNC: 4.8 MMOL/L (ref 3.5–5.2)
PROT SERPL-MCNC: 6.3 G/DL (ref 6–8.5)
RBC # BLD AUTO: 4.4 10*6/MM3 (ref 3.77–5.28)
SODIUM SERPL-SCNC: 140 MMOL/L (ref 136–145)
TRIGL SERPL-MCNC: 184 MG/DL (ref 0–150)
TSH SERPL DL<=0.005 MIU/L-ACNC: 2.4 UIU/ML (ref 0.27–4.2)
VLDLC SERPL CALC-MCNC: 30 MG/DL (ref 5–40)
WBC # BLD AUTO: 4.65 10*3/MM3 (ref 3.4–10.8)

## 2023-10-25 ENCOUNTER — OFFICE VISIT (OUTPATIENT)
Dept: FAMILY MEDICINE CLINIC | Facility: CLINIC | Age: 63
End: 2023-10-25
Payer: COMMERCIAL

## 2023-10-25 VITALS
DIASTOLIC BLOOD PRESSURE: 84 MMHG | HEIGHT: 66 IN | WEIGHT: 209 LBS | HEART RATE: 55 BPM | TEMPERATURE: 97.1 F | SYSTOLIC BLOOD PRESSURE: 136 MMHG | OXYGEN SATURATION: 99 % | BODY MASS INDEX: 33.59 KG/M2

## 2023-10-25 DIAGNOSIS — E11.9 CONTROLLED TYPE 2 DIABETES MELLITUS WITHOUT COMPLICATION, WITHOUT LONG-TERM CURRENT USE OF INSULIN: Primary | ICD-10-CM

## 2023-10-25 DIAGNOSIS — E66.09 EXOGENOUS OBESITY: ICD-10-CM

## 2023-10-25 DIAGNOSIS — I10 ESSENTIAL HYPERTENSION: ICD-10-CM

## 2023-10-25 DIAGNOSIS — E78.2 MIXED HYPERLIPIDEMIA: ICD-10-CM

## 2023-10-25 DIAGNOSIS — E03.9 ACQUIRED HYPOTHYROIDISM: ICD-10-CM

## 2023-10-25 RX ORDER — METFORMIN HYDROCHLORIDE 750 MG/1
750 TABLET, EXTENDED RELEASE ORAL
Start: 2023-10-25

## 2023-10-25 NOTE — PROGRESS NOTES
Subjective   Marlena Waite is a 63 y.o. female with   Chief Complaint   Patient presents with    Diabetes   .    Diabetes       63-year-old white female with known history of hypothyroidism, type II non-insulin-dependent diabetes mellitus as well as hyperlipidemia and hypertension here for further medical management.  Current medications include aspirin at 325 mg daily as well as atenolol 50 mg daily and levothyroxine at 75 mcg daily.  Patient is also using metformin ER at 750 mg using 2 tablets daily.  She is also using rosuvastatin at 5 mg daily.  All medications are used appropriately and will tolerated without side effects.  Patient does use some supplements including vitamin D3.  Fasting labs have been acquired prior to this visit.  She has been continuing to use the [employer] lam and has both lost weight and been able to keep it off now for quite some time.  The following portions of the patient's history were reviewed and updated as appropriate: allergies, current medications, past family history, past medical history, past social history, past surgical history and problem list.    Review of Systems   Cardiovascular:         Hypertension, hyperlipidemia   Endocrine:        Exogenous obesity, vitamin D deficiency, type II non-insulin-dependent diabetes mellitus, hypothyroidism       Objective     Vitals:    10/25/23 0858   BP: 136/84   Pulse: 55   Temp: 97.1 °F (36.2 °C)   SpO2: 99%       Recent Results (from the past 672 hour(s))   Hemoglobin A1c    Collection Time: 10/18/23  8:58 AM    Specimen: Blood   Result Value Ref Range    Hemoglobin A1C 6.10 (H) 4.80 - 5.60 %   Comprehensive metabolic panel    Collection Time: 10/18/23  8:58 AM    Specimen: Blood   Result Value Ref Range    Glucose 147 (H) 65 - 99 mg/dL    BUN 17 8 - 23 mg/dL    Creatinine 0.76 0.57 - 1.00 mg/dL    EGFR Result 87.1 >60.0 mL/min/1.73    BUN/Creatinine Ratio 22.4 7.0 - 25.0    Sodium 140 136 - 145 mmol/L    Potassium 4.8 3.5 - 5.2 mmol/L     Chloride 104 98 - 107 mmol/L    Total CO2 30.3 (H) 22.0 - 29.0 mmol/L    Calcium 10.6 (H) 8.6 - 10.5 mg/dL    Total Protein 6.3 6.0 - 8.5 g/dL    Albumin 4.7 3.5 - 5.2 g/dL    Globulin 1.6 gm/dL    A/G Ratio 2.9 g/dL    Total Bilirubin 0.5 0.0 - 1.2 mg/dL    Alkaline Phosphatase 55 39 - 117 U/L    AST (SGOT) 16 1 - 32 U/L    ALT (SGPT) 22 1 - 33 U/L   TSH    Collection Time: 10/18/23  8:58 AM    Specimen: Blood   Result Value Ref Range    TSH 2.400 0.270 - 4.200 uIU/mL   Vitamin D 25 hydroxy    Collection Time: 10/18/23  8:58 AM    Specimen: Blood   Result Value Ref Range    25 Hydroxy, Vitamin D 33.2 30.0 - 100.0 ng/ml   Lipid panel    Collection Time: 10/18/23  8:58 AM    Specimen: Blood   Result Value Ref Range    Total Cholesterol 136 0 - 200 mg/dL    Triglycerides 184 (H) 0 - 150 mg/dL    HDL Cholesterol 47 40 - 60 mg/dL    VLDL Cholesterol Balta 30 5 - 40 mg/dL    LDL Chol Calc (NIH) 59 0 - 100 mg/dL   CBC w AUTO Differential    Collection Time: 10/18/23  8:58 AM    Specimen: Blood   Result Value Ref Range    WBC 4.65 3.40 - 10.80 10*3/mm3    RBC 4.40 3.77 - 5.28 10*6/mm3    Hemoglobin 14.0 12.0 - 15.9 g/dL    Hematocrit 40.6 34.0 - 46.6 %    MCV 92.3 79.0 - 97.0 fL    MCH 31.8 26.6 - 33.0 pg    MCHC 34.5 31.5 - 35.7 g/dL    RDW 12.7 12.3 - 15.4 %    Platelets 240 140 - 450 10*3/mm3    Neutrophil Rel % 63.5 42.7 - 76.0 %    Lymphocyte Rel % 24.3 19.6 - 45.3 %    Monocyte Rel % 8.6 5.0 - 12.0 %    Eosinophil Rel % 3.0 0.3 - 6.2 %    Basophil Rel % 0.2 0.0 - 1.5 %    Neutrophils Absolute 2.95 1.70 - 7.00 10*3/mm3    Lymphocytes Absolute 1.13 0.70 - 3.10 10*3/mm3    Monocytes Absolute 0.40 0.10 - 0.90 10*3/mm3    Eosinophils Absolute 0.14 0.00 - 0.40 10*3/mm3    Basophils Absolute 0.01 0.00 - 0.20 10*3/mm3    Immature Granulocyte Rel % 0.4 0.0 - 0.5 %    Immature Grans Absolute 0.02 0.00 - 0.05 10*3/mm3    nRBC 0.0 0.0 - 0.2 /100 WBC       Physical Exam  Vitals and nursing note reviewed.   Constitutional:        Appearance: Normal appearance. She is well-developed and well-groomed. She is obese.      Comments: Exogenous obesity with a BMI 34.2   HENT:      Head: Normocephalic and atraumatic.   Neck:      Thyroid: No thyroid mass or thyromegaly.      Vascular: Normal carotid pulses. No carotid bruit.      Trachea: Trachea and phonation normal.   Cardiovascular:      Rate and Rhythm: Normal rate and regular rhythm.      Heart sounds: Normal heart sounds. No murmur heard.     No friction rub. No gallop.   Pulmonary:      Effort: Pulmonary effort is normal. No respiratory distress.      Breath sounds: Normal breath sounds. No decreased breath sounds, wheezing, rhonchi or rales.   Musculoskeletal:      Cervical back: Neck supple.   Lymphadenopathy:      Cervical: No cervical adenopathy.   Skin:     General: Skin is warm and dry.      Findings: No rash.   Neurological:      Mental Status: She is alert and oriented to person, place, and time.   Psychiatric:         Attention and Perception: Attention and perception normal.         Mood and Affect: Mood and affect normal.         Speech: Speech normal.         Behavior: Behavior normal. Behavior is cooperative.         Thought Content: Thought content normal.         Cognition and Memory: Cognition and memory normal.         Judgment: Judgment normal.         Assessment & Plan   Diagnoses and all orders for this visit:    1. Controlled type 2 diabetes mellitus without complication, without long-term current use of insulin (Primary)  -     metFORMIN ER (GLUCOPHAGE-XR) 750 MG 24 hr tablet; Take 1 tablet by mouth Daily With Breakfast.  -     Lipid panel; Future  -     Comprehensive metabolic panel; Future  -     TSH; Future  -     Vitamin D 25 hydroxy; Future  -     Hemoglobin A1c; Future  -     CBC w AUTO Differential; Future  -     Microalbumin / Creatinine Urine Ratio - Urine, Clean Catch; Future    2. Essential hypertension  -     Lipid panel; Future  -     Comprehensive metabolic  panel; Future  -     TSH; Future  -     Vitamin D 25 hydroxy; Future  -     Hemoglobin A1c; Future  -     CBC w AUTO Differential; Future  -     Microalbumin / Creatinine Urine Ratio - Urine, Clean Catch; Future    3. Mixed hyperlipidemia  -     Lipid panel; Future  -     Comprehensive metabolic panel; Future  -     TSH; Future  -     Vitamin D 25 hydroxy; Future  -     Hemoglobin A1c; Future  -     CBC w AUTO Differential; Future  -     Microalbumin / Creatinine Urine Ratio - Urine, Clean Catch; Future    4. Acquired hypothyroidism  -     Lipid panel; Future  -     Comprehensive metabolic panel; Future  -     TSH; Future  -     Vitamin D 25 hydroxy; Future  -     Hemoglobin A1c; Future  -     CBC w AUTO Differential; Future  -     Microalbumin / Creatinine Urine Ratio - Urine, Clean Catch; Future    5. Exogenous obesity  -     Lipid panel; Future  -     Comprehensive metabolic panel; Future  -     TSH; Future  -     Vitamin D 25 hydroxy; Future  -     Hemoglobin A1c; Future  -     CBC w AUTO Differential; Future  -     Microalbumin / Creatinine Urine Ratio - Urine, Clean Catch; Future        Return in about 6 months (around 4/25/2024) for Recheck.

## 2023-11-20 DIAGNOSIS — E03.9 ACQUIRED HYPOTHYROIDISM: ICD-10-CM

## 2023-11-20 RX ORDER — LEVOTHYROXINE SODIUM 0.07 MG/1
TABLET ORAL
Qty: 90 TABLET | Refills: 0 | Status: SHIPPED | OUTPATIENT
Start: 2023-11-20

## 2023-12-10 DIAGNOSIS — E11.9 CONTROLLED TYPE 2 DIABETES MELLITUS WITHOUT COMPLICATION, WITHOUT LONG-TERM CURRENT USE OF INSULIN: ICD-10-CM

## 2023-12-11 RX ORDER — METFORMIN HYDROCHLORIDE 750 MG/1
1500 TABLET, EXTENDED RELEASE ORAL DAILY
Qty: 180 TABLET | Refills: 0 | Status: SHIPPED | OUTPATIENT
Start: 2023-12-11

## 2024-01-17 DIAGNOSIS — E78.2 MIXED HYPERLIPIDEMIA: ICD-10-CM

## 2024-01-17 DIAGNOSIS — I10 ESSENTIAL HYPERTENSION: ICD-10-CM

## 2024-01-18 RX ORDER — ATENOLOL 50 MG/1
TABLET ORAL
Qty: 90 TABLET | Refills: 0 | Status: SHIPPED | OUTPATIENT
Start: 2024-01-18

## 2024-01-18 RX ORDER — ROSUVASTATIN CALCIUM 5 MG/1
TABLET, COATED ORAL
Qty: 90 TABLET | Refills: 0 | Status: SHIPPED | OUTPATIENT
Start: 2024-01-18

## 2024-02-21 DIAGNOSIS — E03.9 ACQUIRED HYPOTHYROIDISM: ICD-10-CM

## 2024-02-21 RX ORDER — LEVOTHYROXINE SODIUM 0.07 MG/1
TABLET ORAL
Qty: 90 TABLET | Refills: 0 | Status: SHIPPED | OUTPATIENT
Start: 2024-02-21

## 2024-04-17 DIAGNOSIS — I10 ESSENTIAL HYPERTENSION: ICD-10-CM

## 2024-04-17 DIAGNOSIS — E78.2 MIXED HYPERLIPIDEMIA: ICD-10-CM

## 2024-04-17 RX ORDER — ROSUVASTATIN CALCIUM 5 MG/1
TABLET, COATED ORAL
Qty: 90 TABLET | Refills: 0 | Status: SHIPPED | OUTPATIENT
Start: 2024-04-17

## 2024-04-17 RX ORDER — ATENOLOL 50 MG/1
TABLET ORAL
Qty: 90 TABLET | Refills: 0 | Status: SHIPPED | OUTPATIENT
Start: 2024-04-17

## 2024-04-22 DIAGNOSIS — E03.9 ACQUIRED HYPOTHYROIDISM: ICD-10-CM

## 2024-04-22 DIAGNOSIS — E78.2 MIXED HYPERLIPIDEMIA: ICD-10-CM

## 2024-04-22 DIAGNOSIS — E66.09 EXOGENOUS OBESITY: ICD-10-CM

## 2024-04-22 DIAGNOSIS — I10 ESSENTIAL HYPERTENSION: ICD-10-CM

## 2024-04-22 DIAGNOSIS — E11.9 CONTROLLED TYPE 2 DIABETES MELLITUS WITHOUT COMPLICATION, WITHOUT LONG-TERM CURRENT USE OF INSULIN: ICD-10-CM

## 2024-04-25 ENCOUNTER — TELEPHONE (OUTPATIENT)
Dept: FAMILY MEDICINE CLINIC | Facility: CLINIC | Age: 64
End: 2024-04-25
Payer: COMMERCIAL

## 2024-04-25 LAB
25(OH)D3+25(OH)D2 SERPL-MCNC: 28.1 NG/ML (ref 30–100)
ALBUMIN SERPL-MCNC: 4.4 G/DL (ref 3.5–5.2)
ALBUMIN/CREAT UR: 28 MG/G CREAT (ref 0–29)
ALBUMIN/GLOB SERPL: 2.1 G/DL
ALP SERPL-CCNC: 51 U/L (ref 39–117)
ALT SERPL-CCNC: 13 U/L (ref 1–33)
AST SERPL-CCNC: 12 U/L (ref 1–32)
BASOPHILS # BLD AUTO: 0.03 10*3/MM3 (ref 0–0.2)
BASOPHILS NFR BLD AUTO: 0.7 % (ref 0–1.5)
BILIRUB SERPL-MCNC: 0.9 MG/DL (ref 0–1.2)
BUN SERPL-MCNC: 20 MG/DL (ref 8–23)
BUN/CREAT SERPL: 27 (ref 7–25)
CALCIUM SERPL-MCNC: 10.5 MG/DL (ref 8.6–10.5)
CHLORIDE SERPL-SCNC: 102 MMOL/L (ref 98–107)
CHOLEST SERPL-MCNC: 133 MG/DL (ref 0–200)
CO2 SERPL-SCNC: 27.7 MMOL/L (ref 22–29)
CREAT SERPL-MCNC: 0.74 MG/DL (ref 0.57–1)
CREAT UR-MCNC: 75.4 MG/DL
EGFRCR SERPLBLD CKD-EPI 2021: 89.4 ML/MIN/1.73
EOSINOPHIL # BLD AUTO: 0.05 10*3/MM3 (ref 0–0.4)
EOSINOPHIL NFR BLD AUTO: 1.2 % (ref 0.3–6.2)
ERYTHROCYTE [DISTWIDTH] IN BLOOD BY AUTOMATED COUNT: 12.8 % (ref 12.3–15.4)
GLOBULIN SER CALC-MCNC: 2.1 GM/DL
GLUCOSE SERPL-MCNC: 129 MG/DL (ref 65–99)
HBA1C MFR BLD: 5.7 % (ref 4.8–5.6)
HCT VFR BLD AUTO: 41.2 % (ref 34–46.6)
HDLC SERPL-MCNC: 50 MG/DL (ref 40–60)
HGB BLD-MCNC: 13.3 G/DL (ref 12–15.9)
IMM GRANULOCYTES # BLD AUTO: 0.01 10*3/MM3 (ref 0–0.05)
IMM GRANULOCYTES NFR BLD AUTO: 0.2 % (ref 0–0.5)
LDLC SERPL CALC-MCNC: 61 MG/DL (ref 0–100)
LYMPHOCYTES # BLD AUTO: 1.05 10*3/MM3 (ref 0.7–3.1)
LYMPHOCYTES NFR BLD AUTO: 25.4 % (ref 19.6–45.3)
MCH RBC QN AUTO: 29.5 PG (ref 26.6–33)
MCHC RBC AUTO-ENTMCNC: 32.3 G/DL (ref 31.5–35.7)
MCV RBC AUTO: 91.4 FL (ref 79–97)
MICROALBUMIN UR-MCNC: 21.2 UG/ML
MONOCYTES # BLD AUTO: 0.34 10*3/MM3 (ref 0.1–0.9)
MONOCYTES NFR BLD AUTO: 8.2 % (ref 5–12)
NEUTROPHILS # BLD AUTO: 2.66 10*3/MM3 (ref 1.7–7)
NEUTROPHILS NFR BLD AUTO: 64.3 % (ref 42.7–76)
NRBC BLD AUTO-RTO: 0 /100 WBC (ref 0–0.2)
PLATELET # BLD AUTO: 220 10*3/MM3 (ref 140–450)
POTASSIUM SERPL-SCNC: 4.3 MMOL/L (ref 3.5–5.2)
PROT SERPL-MCNC: 6.5 G/DL (ref 6–8.5)
RBC # BLD AUTO: 4.51 10*6/MM3 (ref 3.77–5.28)
SODIUM SERPL-SCNC: 136 MMOL/L (ref 136–145)
TRIGL SERPL-MCNC: 126 MG/DL (ref 0–150)
TSH SERPL DL<=0.005 MIU/L-ACNC: 2.04 UIU/ML (ref 0.27–4.2)
VLDLC SERPL CALC-MCNC: 22 MG/DL (ref 5–40)
WBC # BLD AUTO: 4.14 10*3/MM3 (ref 3.4–10.8)

## 2024-04-25 NOTE — TELEPHONE ENCOUNTER
HUB TO RELAY:   Tried to call the patient regarding her scheduled appointment on Wednesday, 05/01/24, with Dr. Tavarez.   We are needing to change this to a MyChart video visit.   Patient did not answer, LVM.

## 2024-05-01 ENCOUNTER — TELEMEDICINE (OUTPATIENT)
Dept: FAMILY MEDICINE CLINIC | Facility: CLINIC | Age: 64
End: 2024-05-01
Payer: COMMERCIAL

## 2024-05-01 DIAGNOSIS — E11.9 CONTROLLED TYPE 2 DIABETES MELLITUS WITHOUT COMPLICATION, WITHOUT LONG-TERM CURRENT USE OF INSULIN: ICD-10-CM

## 2024-05-01 DIAGNOSIS — E03.9 ACQUIRED HYPOTHYROIDISM: ICD-10-CM

## 2024-05-01 DIAGNOSIS — E78.2 MIXED HYPERLIPIDEMIA: ICD-10-CM

## 2024-05-01 DIAGNOSIS — E55.9 VITAMIN D DEFICIENCY: ICD-10-CM

## 2024-05-01 DIAGNOSIS — I10 ESSENTIAL HYPERTENSION: Primary | ICD-10-CM

## 2024-05-01 RX ORDER — LEVOTHYROXINE SODIUM 0.07 MG/1
75 TABLET ORAL DAILY
Qty: 90 TABLET | Refills: 1 | Status: SHIPPED | OUTPATIENT
Start: 2024-05-01

## 2024-05-01 NOTE — PROGRESS NOTES
Subjective   Marlena Waite is a 64 y.o. female with No chief complaint on file.  .    History of Present Illness   64-year-old white female with consented video visit with patient at her home and myself in the office.  Patient with multiple medical issues including hypertension, hyperlipidemia as well as exogenous obesity, vitamin D deficiency and type II non-insulin-dependent diabetes mellitus.  She also has history of hypothyroidism and is postmenopausal.  Medications include aspirin at 325 mg daily.  She is also using Tenormin, levothyroxine as well as metformin ER.  Rosuvastatin is also used.  Patient does use a host of supplements and vitamins.  Fasting labs have been acquired prior to this visit.  Patient has lost a marked amount of weight doing Noom and continues to lose weight since last visit.  She feels better although towards the end of her day when she is on her feet she does admit that her legs are tired and her back is uncomfortable.  Last mammogram was in May of last year and she is therefore due.  Last DEXA scan was also in May of last year found to be completely normal-not due for another 4 years.  She has never had colonoscopy but she is willing to do a Cologuard.  The following portions of the patient's history were reviewed and updated as appropriate: allergies, current medications, past family history, past medical history, past social history, past surgical history and problem list.    Review of Systems   Cardiovascular:         Hypertension, hyperlipidemia   Endocrine:        Type II non-insulin-dependent diabetes mellitus, exogenous obesity, vitamin D deficiency       Objective     There were no vitals filed for this visit.    Recent Results (from the past 672 hour(s))   Microalbumin / Creatinine Urine Ratio - Urine, Clean Catch    Collection Time: 04/24/24  8:22 AM    Specimen: Urine, Clean Catch   Result Value Ref Range    Creatinine, Urine 75.4 Not Estab. mg/dL    Microalbumin, Urine  21.2 Not Estab. ug/mL    Microalbumin/Creatinine Ratio 28 0 - 29 mg/g creat   CBC w AUTO Differential    Collection Time: 04/24/24  8:22 AM    Specimen: Blood   Result Value Ref Range    WBC 4.14 3.40 - 10.80 10*3/mm3    RBC 4.51 3.77 - 5.28 10*6/mm3    Hemoglobin 13.3 12.0 - 15.9 g/dL    Hematocrit 41.2 34.0 - 46.6 %    MCV 91.4 79.0 - 97.0 fL    MCH 29.5 26.6 - 33.0 pg    MCHC 32.3 31.5 - 35.7 g/dL    RDW 12.8 12.3 - 15.4 %    Platelets 220 140 - 450 10*3/mm3    Neutrophil Rel % 64.3 42.7 - 76.0 %    Lymphocyte Rel % 25.4 19.6 - 45.3 %    Monocyte Rel % 8.2 5.0 - 12.0 %    Eosinophil Rel % 1.2 0.3 - 6.2 %    Basophil Rel % 0.7 0.0 - 1.5 %    Neutrophils Absolute 2.66 1.70 - 7.00 10*3/mm3    Lymphocytes Absolute 1.05 0.70 - 3.10 10*3/mm3    Monocytes Absolute 0.34 0.10 - 0.90 10*3/mm3    Eosinophils Absolute 0.05 0.00 - 0.40 10*3/mm3    Basophils Absolute 0.03 0.00 - 0.20 10*3/mm3    Immature Granulocyte Rel % 0.2 0.0 - 0.5 %    Immature Grans Absolute 0.01 0.00 - 0.05 10*3/mm3    nRBC 0.0 0.0 - 0.2 /100 WBC   Hemoglobin A1c    Collection Time: 04/24/24  8:22 AM    Specimen: Blood   Result Value Ref Range    Hemoglobin A1C 5.70 (H) 4.80 - 5.60 %   Vitamin D 25 hydroxy    Collection Time: 04/24/24  8:22 AM    Specimen: Blood   Result Value Ref Range    25 Hydroxy, Vitamin D 28.1 (L) 30.0 - 100.0 ng/ml   TSH    Collection Time: 04/24/24  8:22 AM    Specimen: Blood   Result Value Ref Range    TSH 2.040 0.270 - 4.200 uIU/mL   Comprehensive metabolic panel    Collection Time: 04/24/24  8:22 AM    Specimen: Blood   Result Value Ref Range    Glucose 129 (H) 65 - 99 mg/dL    BUN 20 8 - 23 mg/dL    Creatinine 0.74 0.57 - 1.00 mg/dL    EGFR Result 89.4 >60.0 mL/min/1.73    BUN/Creatinine Ratio 27.0 (H) 7.0 - 25.0    Sodium 136 136 - 145 mmol/L    Potassium 4.3 3.5 - 5.2 mmol/L    Chloride 102 98 - 107 mmol/L    Total CO2 27.7 22.0 - 29.0 mmol/L    Calcium 10.5 8.6 - 10.5 mg/dL    Total Protein 6.5 6.0 - 8.5 g/dL    Albumin  4.4 3.5 - 5.2 g/dL    Globulin 2.1 gm/dL    A/G Ratio 2.1 g/dL    Total Bilirubin 0.9 0.0 - 1.2 mg/dL    Alkaline Phosphatase 51 39 - 117 U/L    AST (SGOT) 12 1 - 32 U/L    ALT (SGPT) 13 1 - 33 U/L   Lipid panel    Collection Time: 04/24/24  8:22 AM    Specimen: Blood   Result Value Ref Range    Total Cholesterol 133 0 - 200 mg/dL    Triglycerides 126 0 - 150 mg/dL    HDL Cholesterol 50 40 - 60 mg/dL    VLDL Cholesterol Balta 22 5 - 40 mg/dL    LDL Chol Calc (NIH) 61 0 - 100 mg/dL       Physical Exam  Vitals and nursing note reviewed.   Constitutional:       Appearance: Normal appearance. She is well-developed and well-groomed.   HENT:      Head: Normocephalic and atraumatic.   Musculoskeletal:      Cervical back: Neck supple.   Skin:     General: Skin is warm and dry.      Findings: No rash.   Neurological:      Mental Status: She is alert and oriented to person, place, and time.   Psychiatric:         Attention and Perception: Attention and perception normal.         Mood and Affect: Mood and affect normal.         Speech: Speech normal.         Behavior: Behavior normal. Behavior is cooperative.         Thought Content: Thought content normal.         Cognition and Memory: Cognition and memory normal.         Judgment: Judgment normal.         Assessment & Plan   Diagnoses and all orders for this visit:    1. Essential hypertension (Primary)  -     Comprehensive metabolic panel; Future  -     Vitamin D 25 hydroxy; Future  -     TSH; Future  -     Lipid panel; Future  -     Hemoglobin A1c; Future  -     CBC w AUTO Differential; Future    2. Mixed hyperlipidemia  -     Comprehensive metabolic panel; Future  -     Vitamin D 25 hydroxy; Future  -     TSH; Future  -     Lipid panel; Future  -     Hemoglobin A1c; Future  -     CBC w AUTO Differential; Future    3. Acquired hypothyroidism  -     levothyroxine (SYNTHROID, LEVOTHROID) 75 MCG tablet; Take 1 tablet by mouth Daily.  Dispense: 90 tablet; Refill: 1  -      Comprehensive metabolic panel; Future  -     Vitamin D 25 hydroxy; Future  -     TSH; Future  -     Lipid panel; Future  -     Hemoglobin A1c; Future  -     CBC w AUTO Differential; Future    4. Controlled type 2 diabetes mellitus without complication, without long-term current use of insulin  -     Comprehensive metabolic panel; Future  -     Vitamin D 25 hydroxy; Future  -     TSH; Future  -     Lipid panel; Future  -     Hemoglobin A1c; Future  -     CBC w AUTO Differential; Future    5. Vitamin D deficiency  -     Comprehensive metabolic panel; Future  -     Vitamin D 25 hydroxy; Future  -     TSH; Future  -     Lipid panel; Future  -     Hemoglobin A1c; Future  -     CBC w AUTO Differential; Future    Patient has done very well therefore metformin will be discontinued.  She has been applauded on her continued weight loss with recommendation to continue.  Other medications will continue without alteration.  Anticipate repeat fasting labs in 6 months with follow-up with me thereafter.  Consented video visit required 20 minutes for completion    Return in about 6 months (around 11/1/2024) for Recheck.

## 2024-07-15 DIAGNOSIS — I10 ESSENTIAL HYPERTENSION: ICD-10-CM

## 2024-07-15 DIAGNOSIS — E78.2 MIXED HYPERLIPIDEMIA: ICD-10-CM

## 2024-07-15 RX ORDER — ATENOLOL 50 MG/1
TABLET ORAL
Qty: 90 TABLET | Refills: 0 | Status: SHIPPED | OUTPATIENT
Start: 2024-07-15

## 2024-07-15 RX ORDER — ROSUVASTATIN CALCIUM 5 MG/1
TABLET, COATED ORAL
Qty: 90 TABLET | Refills: 0 | Status: SHIPPED | OUTPATIENT
Start: 2024-07-15

## 2024-10-15 DIAGNOSIS — I10 ESSENTIAL HYPERTENSION: ICD-10-CM

## 2024-10-15 DIAGNOSIS — E78.2 MIXED HYPERLIPIDEMIA: ICD-10-CM

## 2024-10-15 RX ORDER — ROSUVASTATIN CALCIUM 5 MG/1
TABLET, COATED ORAL
Qty: 90 TABLET | Refills: 0 | Status: SHIPPED | OUTPATIENT
Start: 2024-10-15

## 2024-10-15 RX ORDER — ATENOLOL 50 MG/1
TABLET ORAL
Qty: 90 TABLET | Refills: 0 | Status: SHIPPED | OUTPATIENT
Start: 2024-10-15

## 2024-11-06 ENCOUNTER — OFFICE VISIT (OUTPATIENT)
Dept: FAMILY MEDICINE CLINIC | Facility: CLINIC | Age: 64
End: 2024-11-06
Payer: COMMERCIAL

## 2024-11-06 VITALS
BODY MASS INDEX: 34.27 KG/M2 | TEMPERATURE: 97.3 F | HEIGHT: 66 IN | SYSTOLIC BLOOD PRESSURE: 140 MMHG | WEIGHT: 213.2 LBS | DIASTOLIC BLOOD PRESSURE: 90 MMHG | HEART RATE: 61 BPM | OXYGEN SATURATION: 97 %

## 2024-11-06 DIAGNOSIS — E55.9 VITAMIN D DEFICIENCY: ICD-10-CM

## 2024-11-06 DIAGNOSIS — E78.2 MIXED HYPERLIPIDEMIA: ICD-10-CM

## 2024-11-06 DIAGNOSIS — E03.9 ACQUIRED HYPOTHYROIDISM: ICD-10-CM

## 2024-11-06 DIAGNOSIS — I10 ESSENTIAL HYPERTENSION: Primary | ICD-10-CM

## 2024-11-06 DIAGNOSIS — E11.9 CONTROLLED TYPE 2 DIABETES MELLITUS WITHOUT COMPLICATION, WITHOUT LONG-TERM CURRENT USE OF INSULIN: ICD-10-CM

## 2024-11-06 DIAGNOSIS — E66.09 EXOGENOUS OBESITY: ICD-10-CM

## 2024-11-07 RX ORDER — LEVOTHYROXINE SODIUM 75 UG/1
75 TABLET ORAL DAILY
Qty: 90 TABLET | Refills: 1 | Status: SHIPPED | OUTPATIENT
Start: 2024-11-07

## 2024-11-07 NOTE — PROGRESS NOTES
Subjective   Marlena Waite is a 64 y.o. female with   Chief Complaint   Patient presents with    Hypertension     Labs prior    Hyperlipidemia    Hypothyroidism    Diabetes   .    Hypertension    Hyperlipidemia  Exacerbating diseases include hypothyroidism.   Hypothyroidism  Her past medical history is significant for hyperlipidemia.   Diabetes       64-year-old white female with known history of hypertension, hyperlipidemia as well as hypothyroidism here for further medical management.  Patient is also a type II non-insulin-dependent diabetic.  She has lost a marked amount of weight over the last few years and has been able to reduce her medical regimen for the above issues.  Current medications include atenolol 50 mg daily as well as levothyroxine at 75 mcg daily and rosuvastatin at 5 mg daily.  She does use supplements and vitamins including ferrous sulfate and vitamin D3.  She is also using a 325 mg aspirin daily.  All medications are used appropriately and are well-tolerated without side effects.  Fasting labs have been acquired prior to this visit.  The following portions of the patient's history were reviewed and updated as appropriate: allergies, current medications, past family history, past medical history, past social history, past surgical history and problem list.    Review of Systems   Cardiovascular:         Hypertension, hyperlipidemia   Endocrine:        Exogenous obesity, vitamin D deficiency, hypothyroidism       Objective     Vitals:    11/06/24 0907   BP: 140/90   Pulse: 61   Temp: 97.3 °F (36.3 °C)   SpO2: 97%       Recent Results (from the past 4 weeks)   Comprehensive metabolic panel    Collection Time: 10/30/24 10:23 AM    Specimen: Blood   Result Value Ref Range    Glucose 148 (H) 70 - 99 mg/dL    BUN 14 8 - 27 mg/dL    Creatinine 0.86 0.57 - 1.00 mg/dL    EGFR Result 74 >59 mL/min/1.73    BUN/Creatinine Ratio 16 12 - 28    Sodium 139 134 - 144 mmol/L    Potassium 4.8 3.5 - 5.2 mmol/L     Chloride 103 96 - 106 mmol/L    Total CO2 24 20 - 29 mmol/L    Calcium 10.4 (H) 8.7 - 10.3 mg/dL    Total Protein 6.5 6.0 - 8.5 g/dL    Albumin 4.4 3.9 - 4.9 g/dL    Globulin 2.1 1.5 - 4.5 g/dL    Total Bilirubin 0.7 0.0 - 1.2 mg/dL    Alkaline Phosphatase 55 44 - 121 IU/L    AST (SGOT) 14 0 - 40 IU/L    ALT (SGPT) 17 0 - 32 IU/L   Vitamin D 25 hydroxy    Collection Time: 10/30/24 10:23 AM    Specimen: Blood   Result Value Ref Range    25 Hydroxy, Vitamin D 38.0 30.0 - 100.0 ng/mL   TSH    Collection Time: 10/30/24 10:23 AM    Specimen: Blood   Result Value Ref Range    TSH 2.110 0.450 - 4.500 uIU/mL   Lipid panel    Collection Time: 10/30/24 10:23 AM    Specimen: Blood   Result Value Ref Range    Total Cholesterol 152 100 - 199 mg/dL    Triglycerides 160 (H) 0 - 149 mg/dL    HDL Cholesterol 45 >39 mg/dL    VLDL Cholesterol Balta 28 5 - 40 mg/dL    LDL Chol Calc (NIH) 79 0 - 99 mg/dL   Hemoglobin A1c    Collection Time: 10/30/24 10:23 AM    Specimen: Blood   Result Value Ref Range    Hemoglobin A1C 6.5 (H) 4.8 - 5.6 %   CBC w AUTO Differential    Collection Time: 10/30/24 10:23 AM    Specimen: Blood   Result Value Ref Range    WBC 4.2 3.4 - 10.8 x10E3/uL    RBC 4.53 3.77 - 5.28 x10E6/uL    Hemoglobin 13.8 11.1 - 15.9 g/dL    Hematocrit 42.6 34.0 - 46.6 %    MCV 94 79 - 97 fL    MCH 30.5 26.6 - 33.0 pg    MCHC 32.4 31.5 - 35.7 g/dL    RDW 12.8 11.7 - 15.4 %    Platelets 234 150 - 450 x10E3/uL    Neutrophil Rel % 57 Not Estab. %    Lymphocyte Rel % 30 Not Estab. %    Monocyte Rel % 9 Not Estab. %    Eosinophil Rel % 3 Not Estab. %    Basophil Rel % 1 Not Estab. %    Neutrophils Absolute 2.4 1.4 - 7.0 x10E3/uL    Lymphocytes Absolute 1.3 0.7 - 3.1 x10E3/uL    Monocytes Absolute 0.4 0.1 - 0.9 x10E3/uL    Eosinophils Absolute 0.1 0.0 - 0.4 x10E3/uL    Basophils Absolute 0.0 0.0 - 0.2 x10E3/uL    Immature Granulocyte Rel % 0 Not Estab. %    Immature Grans Absolute 0.0 0.0 - 0.1 x10E3/uL       Physical Exam  Vitals and  nursing note reviewed.   Constitutional:       Appearance: Normal appearance. She is well-developed and well-groomed. She is obese.      Comments: Exogenous obesity with a BMI of 34.9   HENT:      Head: Normocephalic and atraumatic.   Neck:      Thyroid: No thyroid mass or thyromegaly.      Vascular: Normal carotid pulses. No carotid bruit.      Trachea: Trachea and phonation normal.   Cardiovascular:      Rate and Rhythm: Normal rate and regular rhythm.      Heart sounds: Normal heart sounds. No murmur heard.     No friction rub. No gallop.   Pulmonary:      Effort: Pulmonary effort is normal. No respiratory distress.      Breath sounds: Normal breath sounds. No decreased breath sounds, wheezing, rhonchi or rales.   Musculoskeletal:      Cervical back: Neck supple.   Lymphadenopathy:      Cervical: No cervical adenopathy.   Skin:     General: Skin is warm and dry.      Findings: No rash.   Neurological:      Mental Status: She is alert and oriented to person, place, and time.   Psychiatric:         Attention and Perception: Attention and perception normal.         Mood and Affect: Mood and affect normal.         Speech: Speech normal.         Behavior: Behavior normal. Behavior is cooperative.         Thought Content: Thought content normal.         Cognition and Memory: Cognition and memory normal.         Judgment: Judgment normal.         Assessment & Plan   Diagnoses and all orders for this visit:    1. Essential hypertension (Primary)  -     Comprehensive metabolic panel; Future  -     Vitamin D 25 hydroxy; Future  -     TSH; Future  -     Lipid panel; Future  -     Hemoglobin A1c; Future  -     CBC w AUTO Differential; Future    2. Mixed hyperlipidemia  -     Comprehensive metabolic panel; Future  -     Vitamin D 25 hydroxy; Future  -     TSH; Future  -     Lipid panel; Future  -     Hemoglobin A1c; Future  -     CBC w AUTO Differential; Future    3. Vitamin D deficiency  -     Comprehensive metabolic panel;  Future  -     Vitamin D 25 hydroxy; Future  -     TSH; Future  -     Lipid panel; Future  -     Hemoglobin A1c; Future  -     CBC w AUTO Differential; Future    4. Exogenous obesity  -     Comprehensive metabolic panel; Future  -     Vitamin D 25 hydroxy; Future  -     TSH; Future  -     Lipid panel; Future  -     Hemoglobin A1c; Future  -     CBC w AUTO Differential; Future    5. Controlled type 2 diabetes mellitus without complication, without long-term current use of insulin  -     Comprehensive metabolic panel; Future  -     Vitamin D 25 hydroxy; Future  -     TSH; Future  -     Lipid panel; Future  -     Hemoglobin A1c; Future  -     CBC w AUTO Differential; Future    6. Acquired hypothyroidism  -     levothyroxine (SYNTHROID, LEVOTHROID) 75 MCG tablet; Take 1 tablet by mouth Daily.  Dispense: 90 tablet; Refill: 1  -     Comprehensive metabolic panel; Future  -     Vitamin D 25 hydroxy; Future  -     TSH; Future  -     Lipid panel; Future  -     Hemoglobin A1c; Future  -     CBC w AUTO Differential; Future    Further weight loss is essential in regards to improving sugar status.  At this time no further medication will be added.  Current medications will continue without alteration.  Patient has been asked to lower cholesterol in her diet.  Anticipate repeat fasting labs in 6 months with follow-up with me thereafter.    Return in about 6 months (around 5/6/2025) for Recheck.  BMI is >= 30 and <35. (Class 1 Obesity). The following options were offered after discussion;: exercise counseling/recommendations and nutrition counseling/recommendations

## 2025-01-12 DIAGNOSIS — E78.2 MIXED HYPERLIPIDEMIA: ICD-10-CM

## 2025-01-12 DIAGNOSIS — I10 ESSENTIAL HYPERTENSION: ICD-10-CM

## 2025-01-13 RX ORDER — ATENOLOL 50 MG/1
TABLET ORAL
Qty: 90 TABLET | Refills: 1 | Status: SHIPPED | OUTPATIENT
Start: 2025-01-13

## 2025-01-13 RX ORDER — ROSUVASTATIN CALCIUM 5 MG/1
TABLET, COATED ORAL
Qty: 90 TABLET | Refills: 0 | Status: SHIPPED | OUTPATIENT
Start: 2025-01-13

## 2025-04-09 DIAGNOSIS — E78.2 MIXED HYPERLIPIDEMIA: ICD-10-CM

## 2025-04-09 RX ORDER — ROSUVASTATIN CALCIUM 5 MG/1
5 TABLET, COATED ORAL DAILY
Qty: 90 TABLET | Refills: 0 | Status: SHIPPED | OUTPATIENT
Start: 2025-04-09

## 2025-05-06 DIAGNOSIS — E55.9 VITAMIN D DEFICIENCY: ICD-10-CM

## 2025-05-06 DIAGNOSIS — E78.2 MIXED HYPERLIPIDEMIA: ICD-10-CM

## 2025-05-06 DIAGNOSIS — E03.9 ACQUIRED HYPOTHYROIDISM: ICD-10-CM

## 2025-05-06 DIAGNOSIS — E11.9 CONTROLLED TYPE 2 DIABETES MELLITUS WITHOUT COMPLICATION, WITHOUT LONG-TERM CURRENT USE OF INSULIN: ICD-10-CM

## 2025-05-06 DIAGNOSIS — I10 ESSENTIAL HYPERTENSION: ICD-10-CM

## 2025-05-06 DIAGNOSIS — E66.09 EXOGENOUS OBESITY: ICD-10-CM

## 2025-05-08 LAB
25(OH)D3+25(OH)D2 SERPL-MCNC: 46.5 NG/ML (ref 30–100)
ALBUMIN SERPL-MCNC: 4.4 G/DL (ref 3.5–5.2)
ALBUMIN/GLOB SERPL: 1.8 G/DL
ALP SERPL-CCNC: 62 U/L (ref 39–117)
ALT SERPL-CCNC: 26 U/L (ref 1–33)
AST SERPL-CCNC: 17 U/L (ref 1–32)
BASOPHILS # BLD AUTO: 0.04 10*3/MM3 (ref 0–0.2)
BASOPHILS NFR BLD AUTO: 0.9 % (ref 0–1.5)
BILIRUB SERPL-MCNC: 0.7 MG/DL (ref 0–1.2)
BUN SERPL-MCNC: 15 MG/DL (ref 8–23)
BUN/CREAT SERPL: 17.4 (ref 7–25)
CALCIUM SERPL-MCNC: 10.7 MG/DL (ref 8.6–10.5)
CHLORIDE SERPL-SCNC: 102 MMOL/L (ref 98–107)
CHOLEST SERPL-MCNC: 166 MG/DL (ref 0–200)
CO2 SERPL-SCNC: 29.3 MMOL/L (ref 22–29)
CREAT SERPL-MCNC: 0.86 MG/DL (ref 0.57–1)
EGFRCR SERPLBLD CKD-EPI 2021: 74.2 ML/MIN/1.73
EOSINOPHIL # BLD AUTO: 0.09 10*3/MM3 (ref 0–0.4)
EOSINOPHIL NFR BLD AUTO: 1.9 % (ref 0.3–6.2)
ERYTHROCYTE [DISTWIDTH] IN BLOOD BY AUTOMATED COUNT: 13.1 % (ref 12.3–15.4)
GLOBULIN SER CALC-MCNC: 2.4 GM/DL
GLUCOSE SERPL-MCNC: 205 MG/DL (ref 65–99)
HBA1C MFR BLD: 7.3 % (ref 4.8–5.6)
HCT VFR BLD AUTO: 42.9 % (ref 34–46.6)
HDLC SERPL-MCNC: 52 MG/DL (ref 40–60)
HGB BLD-MCNC: 14.4 G/DL (ref 12–15.9)
IMM GRANULOCYTES # BLD AUTO: 0.01 10*3/MM3 (ref 0–0.05)
IMM GRANULOCYTES NFR BLD AUTO: 0.2 % (ref 0–0.5)
LDLC SERPL CALC-MCNC: 69 MG/DL (ref 0–100)
LYMPHOCYTES # BLD AUTO: 1.09 10*3/MM3 (ref 0.7–3.1)
LYMPHOCYTES NFR BLD AUTO: 23.5 % (ref 19.6–45.3)
MCH RBC QN AUTO: 30.8 PG (ref 26.6–33)
MCHC RBC AUTO-ENTMCNC: 33.6 G/DL (ref 31.5–35.7)
MCV RBC AUTO: 91.9 FL (ref 79–97)
MONOCYTES # BLD AUTO: 0.44 10*3/MM3 (ref 0.1–0.9)
MONOCYTES NFR BLD AUTO: 9.5 % (ref 5–12)
NEUTROPHILS # BLD AUTO: 2.96 10*3/MM3 (ref 1.7–7)
NEUTROPHILS NFR BLD AUTO: 64 % (ref 42.7–76)
NRBC BLD AUTO-RTO: 0 /100 WBC (ref 0–0.2)
PLATELET # BLD AUTO: 222 10*3/MM3 (ref 140–450)
POTASSIUM SERPL-SCNC: 4.9 MMOL/L (ref 3.5–5.2)
PROT SERPL-MCNC: 6.8 G/DL (ref 6–8.5)
RBC # BLD AUTO: 4.67 10*6/MM3 (ref 3.77–5.28)
SODIUM SERPL-SCNC: 140 MMOL/L (ref 136–145)
TRIGL SERPL-MCNC: 280 MG/DL (ref 0–150)
TSH SERPL DL<=0.005 MIU/L-ACNC: 2.5 UIU/ML (ref 0.27–4.2)
VLDLC SERPL CALC-MCNC: 45 MG/DL (ref 5–40)
WBC # BLD AUTO: 4.63 10*3/MM3 (ref 3.4–10.8)

## 2025-05-13 DIAGNOSIS — E03.9 ACQUIRED HYPOTHYROIDISM: ICD-10-CM

## 2025-05-13 RX ORDER — LEVOTHYROXINE SODIUM 75 UG/1
75 TABLET ORAL DAILY
Qty: 90 TABLET | Refills: 0 | Status: SHIPPED | OUTPATIENT
Start: 2025-05-13

## 2025-05-21 ENCOUNTER — OFFICE VISIT (OUTPATIENT)
Dept: FAMILY MEDICINE CLINIC | Facility: CLINIC | Age: 65
End: 2025-05-21
Payer: COMMERCIAL

## 2025-05-21 VITALS
BODY MASS INDEX: 35.03 KG/M2 | TEMPERATURE: 97.3 F | HEART RATE: 58 BPM | SYSTOLIC BLOOD PRESSURE: 138 MMHG | DIASTOLIC BLOOD PRESSURE: 82 MMHG | OXYGEN SATURATION: 100 % | HEIGHT: 66 IN | WEIGHT: 218 LBS

## 2025-05-21 DIAGNOSIS — I10 ESSENTIAL HYPERTENSION: ICD-10-CM

## 2025-05-21 DIAGNOSIS — E55.9 VITAMIN D DEFICIENCY: ICD-10-CM

## 2025-05-21 DIAGNOSIS — E11.9 CONTROLLED TYPE 2 DIABETES MELLITUS WITHOUT COMPLICATION, WITHOUT LONG-TERM CURRENT USE OF INSULIN: Primary | ICD-10-CM

## 2025-05-21 DIAGNOSIS — E78.2 MIXED HYPERLIPIDEMIA: ICD-10-CM

## 2025-05-21 DIAGNOSIS — E66.09 EXOGENOUS OBESITY: ICD-10-CM

## 2025-05-22 NOTE — PROGRESS NOTES
Subjective   Marlena Waite is a 65 y.o. female with   Chief Complaint   Patient presents with    Hypothyroidism     Lab follow up     Hypertension    Hyperlipidemia   .    Hypothyroidism  Her past medical history is significant for hyperlipidemia.   Hypertension  Hyperlipidemia  Exacerbating diseases include hypothyroidism.      65-year-old white female with multiple medical issues here for further medical management.  Patient with long history of hypothyroidism, hypertension and hyperlipidemia.  She also has a long history of exogenous obesity and admits to an disciplined eating as of late.  They have been consuming much in the way of pizza and fast foods and patient has demonstrated a 5 pound weight gain since last visit.  Fasting labs have been acquired prior to this visit.  Current medications include aspirin, atenolol, levothyroxine as well as rosuvastatin.  She does use supplements and vitamins.  She does not use any medication for diabetes.  All medications and supplements are used appropriately and are well-tolerated without side effects.  The following portions of the patient's history were reviewed and updated as appropriate: allergies, current medications, past family history, past medical history, past social history, past surgical history and problem list.    Review of Systems   Cardiovascular:         Hypertension, hyperlipidemia   Endocrine:        Exogenous obesity, vitamin D deficiency, hypothyroidism, glucose intolerance       Objective     Vitals:    05/21/25 1350   BP: 138/82   Pulse: 58   Temp: 97.3 °F (36.3 °C)   SpO2: 100%       Recent Results (from the past 4 weeks)   Comprehensive metabolic panel    Collection Time: 05/07/25  8:58 AM    Specimen: Blood   Result Value Ref Range    Glucose 205 (H) 65 - 99 mg/dL    BUN 15 8 - 23 mg/dL    Creatinine 0.86 0.57 - 1.00 mg/dL    EGFR Result 74.2 >60.0 mL/min/1.73    BUN/Creatinine Ratio 17.4 7.0 - 25.0    Sodium 140 136 - 145 mmol/L    Potassium  4.9 3.5 - 5.2 mmol/L    Chloride 102 98 - 107 mmol/L    Total CO2 29.3 (H) 22.0 - 29.0 mmol/L    Calcium 10.7 (H) 8.6 - 10.5 mg/dL    Total Protein 6.8 6.0 - 8.5 g/dL    Albumin 4.4 3.5 - 5.2 g/dL    Globulin 2.4 gm/dL    A/G Ratio 1.8 g/dL    Total Bilirubin 0.7 0.0 - 1.2 mg/dL    Alkaline Phosphatase 62 39 - 117 U/L    AST (SGOT) 17 1 - 32 U/L    ALT (SGPT) 26 1 - 33 U/L   Vitamin D 25 hydroxy    Collection Time: 05/07/25  8:58 AM    Specimen: Blood   Result Value Ref Range    25 Hydroxy, Vitamin D 46.5 30.0 - 100.0 ng/ml   TSH    Collection Time: 05/07/25  8:58 AM    Specimen: Blood   Result Value Ref Range    TSH 2.500 0.270 - 4.200 uIU/mL   Lipid panel    Collection Time: 05/07/25  8:58 AM    Specimen: Blood   Result Value Ref Range    Total Cholesterol 166 0 - 200 mg/dL    Triglycerides 280 (H) 0 - 150 mg/dL    HDL Cholesterol 52 40 - 60 mg/dL    VLDL Cholesterol Balta 45 (H) 5 - 40 mg/dL    LDL Chol Calc (NIH) 69 0 - 100 mg/dL   Hemoglobin A1c    Collection Time: 05/07/25  8:58 AM    Specimen: Blood   Result Value Ref Range    Hemoglobin A1C 7.30 (H) 4.80 - 5.60 %   CBC w AUTO Differential    Collection Time: 05/07/25  8:58 AM    Specimen: Blood   Result Value Ref Range    WBC 4.63 3.40 - 10.80 10*3/mm3    RBC 4.67 3.77 - 5.28 10*6/mm3    Hemoglobin 14.4 12.0 - 15.9 g/dL    Hematocrit 42.9 34.0 - 46.6 %    MCV 91.9 79.0 - 97.0 fL    MCH 30.8 26.6 - 33.0 pg    MCHC 33.6 31.5 - 35.7 g/dL    RDW 13.1 12.3 - 15.4 %    Platelets 222 140 - 450 10*3/mm3    Neutrophil Rel % 64.0 42.7 - 76.0 %    Lymphocyte Rel % 23.5 19.6 - 45.3 %    Monocyte Rel % 9.5 5.0 - 12.0 %    Eosinophil Rel % 1.9 0.3 - 6.2 %    Basophil Rel % 0.9 0.0 - 1.5 %    Neutrophils Absolute 2.96 1.70 - 7.00 10*3/mm3    Lymphocytes Absolute 1.09 0.70 - 3.10 10*3/mm3    Monocytes Absolute 0.44 0.10 - 0.90 10*3/mm3    Eosinophils Absolute 0.09 0.00 - 0.40 10*3/mm3    Basophils Absolute 0.04 0.00 - 0.20 10*3/mm3    Immature Granulocyte Rel % 0.2 0.0 - 0.5  %    Immature Grans Absolute 0.01 0.00 - 0.05 10*3/mm3    nRBC 0.0 0.0 - 0.2 /100 WBC       Physical Exam  Vitals and nursing note reviewed.   Constitutional:       Appearance: Normal appearance. She is well-developed and well-groomed. She is obese.      Comments: Exogenous obesity with a BMI of 35.7   HENT:      Head: Normocephalic and atraumatic.   Neck:      Thyroid: No thyroid mass or thyromegaly.      Vascular: Normal carotid pulses. No carotid bruit.      Trachea: Trachea and phonation normal.   Cardiovascular:      Rate and Rhythm: Normal rate and regular rhythm.      Heart sounds: Normal heart sounds. No murmur heard.     No friction rub. No gallop.   Pulmonary:      Effort: Pulmonary effort is normal. No respiratory distress.      Breath sounds: Normal breath sounds. No decreased breath sounds, wheezing, rhonchi or rales.   Musculoskeletal:      Cervical back: Neck supple.   Lymphadenopathy:      Cervical: No cervical adenopathy.   Skin:     General: Skin is warm and dry.      Findings: No rash.   Neurological:      Mental Status: She is alert and oriented to person, place, and time.   Psychiatric:         Attention and Perception: Attention and perception normal.         Mood and Affect: Mood and affect normal.         Speech: Speech normal.         Behavior: Behavior normal. Behavior is cooperative.         Thought Content: Thought content normal.         Cognition and Memory: Cognition and memory normal.         Judgment: Judgment normal.         Assessment & Plan   Diagnoses and all orders for this visit:    1. Controlled type 2 diabetes mellitus without complication, without long-term current use of insulin (Primary)  -     Lipid panel; Future  -     Comprehensive metabolic panel; Future  -     TSH; Future  -     Vitamin D 25 hydroxy; Future  -     Hemoglobin A1c; Future  -     CBC w AUTO Differential; Future    2. Essential hypertension  -     Lipid panel; Future  -     Comprehensive metabolic panel;  Future  -     TSH; Future  -     Vitamin D 25 hydroxy; Future  -     Hemoglobin A1c; Future  -     CBC w AUTO Differential; Future    3. Mixed hyperlipidemia  -     Lipid panel; Future  -     Comprehensive metabolic panel; Future  -     TSH; Future  -     Vitamin D 25 hydroxy; Future  -     Hemoglobin A1c; Future  -     CBC w AUTO Differential; Future    4. Exogenous obesity  -     Lipid panel; Future  -     Comprehensive metabolic panel; Future  -     TSH; Future  -     Vitamin D 25 hydroxy; Future  -     Hemoglobin A1c; Future  -     CBC w AUTO Differential; Future    5. Vitamin D deficiency  -     Lipid panel; Future  -     Comprehensive metabolic panel; Future  -     TSH; Future  -     Vitamin D 25 hydroxy; Future  -     Hemoglobin A1c; Future  -     CBC w AUTO Differential; Future    Long discussion in regards to treatment of diabetes mellitus.  Patient voices understanding and begs to have 1 more chance to see if she can do this on her own.  Or stands this will require weight loss which demands a more disciplined diet and increase exercise.  Anticipate repeat fasting labs in 4 to 5 months for reevaluation and if patient is not to goal at that time diabetic medication will be added.    Return in about 5 months (around 10/21/2025).

## 2025-07-05 DIAGNOSIS — E78.2 MIXED HYPERLIPIDEMIA: ICD-10-CM

## 2025-07-05 DIAGNOSIS — I10 ESSENTIAL HYPERTENSION: ICD-10-CM

## 2025-07-07 RX ORDER — ATENOLOL 50 MG/1
50 TABLET ORAL DAILY
Qty: 90 TABLET | Refills: 0 | Status: SHIPPED | OUTPATIENT
Start: 2025-07-07

## 2025-07-07 RX ORDER — ROSUVASTATIN CALCIUM 5 MG/1
5 TABLET, COATED ORAL DAILY
Qty: 90 TABLET | Refills: 0 | Status: SHIPPED | OUTPATIENT
Start: 2025-07-07

## 2025-08-12 DIAGNOSIS — E03.9 ACQUIRED HYPOTHYROIDISM: ICD-10-CM

## 2025-08-12 RX ORDER — LEVOTHYROXINE SODIUM 75 UG/1
75 TABLET ORAL DAILY
Qty: 90 TABLET | Refills: 0 | Status: SHIPPED | OUTPATIENT
Start: 2025-08-12